# Patient Record
Sex: FEMALE | Race: OTHER | NOT HISPANIC OR LATINO
[De-identification: names, ages, dates, MRNs, and addresses within clinical notes are randomized per-mention and may not be internally consistent; named-entity substitution may affect disease eponyms.]

---

## 2017-05-16 ENCOUNTER — APPOINTMENT (OUTPATIENT)
Dept: ENDOCRINOLOGY | Facility: CLINIC | Age: 32
End: 2017-05-16

## 2017-08-10 ENCOUNTER — APPOINTMENT (OUTPATIENT)
Dept: ENDOCRINOLOGY | Facility: CLINIC | Age: 32
End: 2017-08-10

## 2017-11-04 ENCOUNTER — TRANSCRIPTION ENCOUNTER (OUTPATIENT)
Age: 32
End: 2017-11-04

## 2018-07-12 ENCOUNTER — APPOINTMENT (OUTPATIENT)
Dept: ENDOCRINOLOGY | Facility: CLINIC | Age: 33
End: 2018-07-12
Payer: COMMERCIAL

## 2018-07-12 VITALS
SYSTOLIC BLOOD PRESSURE: 112 MMHG | HEIGHT: 61 IN | WEIGHT: 186.5 LBS | BODY MASS INDEX: 35.21 KG/M2 | DIASTOLIC BLOOD PRESSURE: 77 MMHG | RESPIRATION RATE: 76 BRPM

## 2018-07-12 LAB
BASOPHILS # BLD AUTO: 0.02 K/UL
BASOPHILS NFR BLD AUTO: 0.3 %
EOSINOPHIL # BLD AUTO: 0.16 K/UL
EOSINOPHIL NFR BLD AUTO: 2 %
GLUCOSE BLDC GLUCOMTR-MCNC: 205
HBA1C MFR BLD HPLC: 10.4
HCT VFR BLD CALC: 40.1 %
HGB BLD-MCNC: 12.9 G/DL
IMM GRANULOCYTES NFR BLD AUTO: 0.5 %
LYMPHOCYTES # BLD AUTO: 2.73 K/UL
LYMPHOCYTES NFR BLD AUTO: 34.9 %
MAN DIFF?: NORMAL
MCHC RBC-ENTMCNC: 25.8 PG
MCHC RBC-ENTMCNC: 32.2 GM/DL
MCV RBC AUTO: 80.2 FL
MONOCYTES # BLD AUTO: 0.35 K/UL
MONOCYTES NFR BLD AUTO: 4.5 %
NEUTROPHILS # BLD AUTO: 4.52 K/UL
NEUTROPHILS NFR BLD AUTO: 57.8 %
PLATELET # BLD AUTO: 230 K/UL
RBC # BLD: 5 M/UL
RBC # FLD: 14.6 %
WBC # FLD AUTO: 7.82 K/UL

## 2018-07-12 PROCEDURE — 99204 OFFICE O/P NEW MOD 45 MIN: CPT | Mod: 25

## 2018-07-12 PROCEDURE — 82962 GLUCOSE BLOOD TEST: CPT

## 2018-07-12 PROCEDURE — 83036 HEMOGLOBIN GLYCOSYLATED A1C: CPT | Mod: QW

## 2018-07-12 RX ORDER — BLOOD-GLUCOSE SENSOR
EACH MISCELLANEOUS
Qty: 3 | Refills: 5 | Status: DISCONTINUED | COMMUNITY
Start: 2018-07-12 | End: 2018-07-12

## 2018-07-18 LAB
25(OH)D3 SERPL-MCNC: 14.3 NG/ML
ALBUMIN SERPL ELPH-MCNC: 4.3 G/DL
ALP BLD-CCNC: 98 U/L
ALT SERPL-CCNC: 15 U/L
ANION GAP SERPL CALC-SCNC: 17 MMOL/L
AST SERPL-CCNC: 15 U/L
BILIRUB SERPL-MCNC: 0.4 MG/DL
BUN SERPL-MCNC: 16 MG/DL
CALCIUM SERPL-MCNC: 8.6 MG/DL
CHLORIDE SERPL-SCNC: 99 MMOL/L
CHOLEST SERPL-MCNC: 188 MG/DL
CHOLEST/HDLC SERPL: 3.8 RATIO
CO2 SERPL-SCNC: 22 MMOL/L
CREAT SERPL-MCNC: 0.78 MG/DL
ESTIMATED AVERAGE GLUCOSE: 258 MG/DL
GLUCOSE SERPL-MCNC: 297 MG/DL
HBA1C MFR BLD HPLC: 10.6 %
HDLC SERPL-MCNC: 50 MG/DL
LDLC SERPL CALC-MCNC: 118 MG/DL
POTASSIUM SERPL-SCNC: 4.4 MMOL/L
PROT SERPL-MCNC: 7 G/DL
SODIUM SERPL-SCNC: 138 MMOL/L
TRIGL SERPL-MCNC: 99 MG/DL
TSH SERPL-ACNC: 7.27 UIU/ML

## 2018-08-23 ENCOUNTER — APPOINTMENT (OUTPATIENT)
Dept: ENDOCRINOLOGY | Facility: CLINIC | Age: 33
End: 2018-08-23

## 2018-09-18 ENCOUNTER — APPOINTMENT (OUTPATIENT)
Dept: ENDOCRINOLOGY | Facility: CLINIC | Age: 33
End: 2018-09-18

## 2018-10-03 ENCOUNTER — APPOINTMENT (OUTPATIENT)
Dept: ENDOCRINOLOGY | Facility: CLINIC | Age: 33
End: 2018-10-03
Payer: MEDICAID

## 2018-10-03 ENCOUNTER — LABORATORY RESULT (OUTPATIENT)
Age: 33
End: 2018-10-03

## 2018-10-03 VITALS
WEIGHT: 188 LBS | DIASTOLIC BLOOD PRESSURE: 84 MMHG | BODY MASS INDEX: 35.52 KG/M2 | HEART RATE: 93 BPM | SYSTOLIC BLOOD PRESSURE: 122 MMHG

## 2018-10-03 LAB
GLUCOSE BLDC GLUCOMTR-MCNC: 164
HBA1C MFR BLD HPLC: 9.3

## 2018-10-03 PROCEDURE — 97802 MEDICAL NUTRITION INDIV IN: CPT

## 2018-10-03 PROCEDURE — 82962 GLUCOSE BLOOD TEST: CPT

## 2018-10-03 PROCEDURE — 83036 HEMOGLOBIN GLYCOSYLATED A1C: CPT | Mod: QW

## 2018-10-03 PROCEDURE — 99214 OFFICE O/P EST MOD 30 MIN: CPT | Mod: 25

## 2018-10-03 RX ORDER — LEVOTHYROXINE SODIUM 0.1 MG/1
100 TABLET ORAL
Refills: 0 | Status: DISCONTINUED | COMMUNITY
End: 2018-10-03

## 2018-10-03 RX ORDER — TRANSPARENT DRESSING 2.37X2.75"
BANDAGE TOPICAL
Qty: 1 | Refills: 1 | Status: ACTIVE | COMMUNITY
Start: 2018-10-03 | End: 1900-01-01

## 2018-10-09 LAB
25(OH)D3 SERPL-MCNC: 13.6 NG/ML
ALBUMIN SERPL ELPH-MCNC: 4.2 G/DL
ALP BLD-CCNC: 96 U/L
ALT SERPL-CCNC: 16 U/L
ANION GAP SERPL CALC-SCNC: 16 MMOL/L
APPEARANCE: ABNORMAL
AST SERPL-CCNC: 18 U/L
BASOPHILS # BLD AUTO: 0.04 K/UL
BASOPHILS NFR BLD AUTO: 0.4 %
BILIRUB SERPL-MCNC: 0.4 MG/DL
BILIRUBIN URINE: NEGATIVE
BLOOD URINE: NEGATIVE
BUN SERPL-MCNC: 11 MG/DL
C PEPTIDE SERPL-MCNC: <0.1 NG/ML
CALCIUM SERPL-MCNC: 9.4 MG/DL
CHLORIDE SERPL-SCNC: 101 MMOL/L
CO2 SERPL-SCNC: 22 MMOL/L
COLOR: YELLOW
CREAT SERPL-MCNC: 0.74 MG/DL
CREAT SPEC-SCNC: 71 MG/DL
EOSINOPHIL # BLD AUTO: 0.18 K/UL
EOSINOPHIL NFR BLD AUTO: 1.9 %
ESTIMATED AVERAGE GLUCOSE: 235 MG/DL
GAD65 AB SER-MCNC: 0.24 NMOL/L
GLUCOSE QUALITATIVE U: NEGATIVE MG/DL
GLUCOSE SERPL-MCNC: 160 MG/DL
HBA1C MFR BLD HPLC: 9.8 %
HCT VFR BLD CALC: 39.7 %
HGB BLD-MCNC: 12.5 G/DL
IMM GRANULOCYTES NFR BLD AUTO: 0.6 %
INSULIN ANTIBODIES-ESOTERIX: 9.6 UU/ML
KETONES URINE: NEGATIVE
LEUKOCYTE ESTERASE URINE: ABNORMAL
LYMPHOCYTES # BLD AUTO: 2.74 K/UL
LYMPHOCYTES NFR BLD AUTO: 29.4 %
MAN DIFF?: NORMAL
MCHC RBC-ENTMCNC: 25.5 PG
MCHC RBC-ENTMCNC: 31.5 GM/DL
MCV RBC AUTO: 80.9 FL
MICROALBUMIN 24H UR DL<=1MG/L-MCNC: <1.2 MG/DL
MICROALBUMIN/CREAT 24H UR-RTO: NORMAL
MONOCYTES # BLD AUTO: 0.54 K/UL
MONOCYTES NFR BLD AUTO: 5.8 %
NEUTROPHILS # BLD AUTO: 5.77 K/UL
NEUTROPHILS NFR BLD AUTO: 61.9 %
NITRITE URINE: NEGATIVE
PH URINE: 6.5
PLATELET # BLD AUTO: 245 K/UL
POTASSIUM SERPL-SCNC: 4.2 MMOL/L
PROT SERPL-MCNC: 7.1 G/DL
PROTEIN URINE: NEGATIVE MG/DL
RBC # BLD: 4.91 M/UL
RBC # FLD: 14 %
SODIUM SERPL-SCNC: 139 MMOL/L
SPECIFIC GRAVITY URINE: 1.02
TSH SERPL-ACNC: 0.27 UIU/ML
UROBILINOGEN URINE: NEGATIVE MG/DL
WBC # FLD AUTO: 9.33 K/UL

## 2018-11-19 ENCOUNTER — RX RENEWAL (OUTPATIENT)
Age: 33
End: 2018-11-19

## 2019-01-03 ENCOUNTER — APPOINTMENT (OUTPATIENT)
Dept: ENDOCRINOLOGY | Facility: CLINIC | Age: 34
End: 2019-01-03

## 2019-04-30 ENCOUNTER — RX RENEWAL (OUTPATIENT)
Age: 34
End: 2019-04-30

## 2019-05-08 ENCOUNTER — APPOINTMENT (OUTPATIENT)
Dept: ENDOCRINOLOGY | Facility: CLINIC | Age: 34
End: 2019-05-08

## 2019-05-21 ENCOUNTER — RX RENEWAL (OUTPATIENT)
Age: 34
End: 2019-05-21

## 2019-05-29 ENCOUNTER — APPOINTMENT (OUTPATIENT)
Dept: ENDOCRINOLOGY | Facility: CLINIC | Age: 34
End: 2019-05-29
Payer: MEDICAID

## 2019-05-29 VITALS
WEIGHT: 186 LBS | SYSTOLIC BLOOD PRESSURE: 101 MMHG | HEART RATE: 99 BPM | BODY MASS INDEX: 35.14 KG/M2 | DIASTOLIC BLOOD PRESSURE: 75 MMHG

## 2019-05-29 DIAGNOSIS — Z83.3 FAMILY HISTORY OF DIABETES MELLITUS: ICD-10-CM

## 2019-05-29 PROCEDURE — 99214 OFFICE O/P EST MOD 30 MIN: CPT | Mod: 25

## 2019-05-29 PROCEDURE — 82962 GLUCOSE BLOOD TEST: CPT

## 2019-05-29 PROCEDURE — 83036 HEMOGLOBIN GLYCOSYLATED A1C: CPT | Mod: QW

## 2019-05-29 RX ORDER — INSULIN LISPRO 100 [IU]/ML
100 INJECTION, SOLUTION INTRAVENOUS; SUBCUTANEOUS
Qty: 2 | Refills: 1 | Status: ACTIVE | COMMUNITY
Start: 2019-04-30 | End: 1900-01-01

## 2019-05-30 LAB
25(OH)D3 SERPL-MCNC: 7.4 NG/ML
ALBUMIN SERPL ELPH-MCNC: 4.3 G/DL
ALP BLD-CCNC: 141 U/L
ALT SERPL-CCNC: 17 U/L
ANION GAP SERPL CALC-SCNC: 14 MMOL/L
AST SERPL-CCNC: 21 U/L
BASOPHILS # BLD AUTO: 0.05 K/UL
BASOPHILS NFR BLD AUTO: 0.5 %
BILIRUB SERPL-MCNC: 0.3 MG/DL
BUN SERPL-MCNC: 16 MG/DL
CALCIUM SERPL-MCNC: 9.9 MG/DL
CHLORIDE SERPL-SCNC: 101 MMOL/L
CHOLEST SERPL-MCNC: 203 MG/DL
CHOLEST/HDLC SERPL: 3.8 RATIO
CO2 SERPL-SCNC: 25 MMOL/L
CREAT SERPL-MCNC: 0.7 MG/DL
EOSINOPHIL # BLD AUTO: 0.4 K/UL
EOSINOPHIL NFR BLD AUTO: 3.9 %
ESTIMATED AVERAGE GLUCOSE: 235 MG/DL
GLUCOSE BLDC GLUCOMTR-MCNC: 130
GLUCOSE SERPL-MCNC: 95 MG/DL
HBA1C MFR BLD HPLC: 9.4
HBA1C MFR BLD HPLC: 9.8 %
HCT VFR BLD CALC: 42.7 %
HDLC SERPL-MCNC: 53 MG/DL
HGB BLD-MCNC: 13.2 G/DL
IMM GRANULOCYTES NFR BLD AUTO: 0.4 %
LDLC SERPL CALC-MCNC: 101 MG/DL
LYMPHOCYTES # BLD AUTO: 3.39 K/UL
LYMPHOCYTES NFR BLD AUTO: 33.2 %
MAN DIFF?: NORMAL
MCHC RBC-ENTMCNC: 25.2 PG
MCHC RBC-ENTMCNC: 30.9 GM/DL
MCV RBC AUTO: 81.6 FL
MONOCYTES # BLD AUTO: 0.6 K/UL
MONOCYTES NFR BLD AUTO: 5.9 %
NEUTROPHILS # BLD AUTO: 5.73 K/UL
NEUTROPHILS NFR BLD AUTO: 56.1 %
PLATELET # BLD AUTO: 283 K/UL
POTASSIUM SERPL-SCNC: 4.2 MMOL/L
PROT SERPL-MCNC: 7.4 G/DL
RBC # BLD: 5.23 M/UL
RBC # FLD: 14.2 %
SODIUM SERPL-SCNC: 140 MMOL/L
T4 FREE SERPL-MCNC: 1.5 NG/DL
TRIGL SERPL-MCNC: 244 MG/DL
TSH SERPL-ACNC: 0.47 UIU/ML
WBC # FLD AUTO: 10.21 K/UL

## 2019-05-30 NOTE — ASSESSMENT
[Long Term Vascular Complications] : long term vascular complications of diabetes [Importance of Diet and Exercise] : importance of diet and exercise to improve glycemic control, achieve weight loss and improve cardiovascular health [Action and use of Insulin] : action and use of short and long-acting insulin [Self Monitoring of Blood Glucose] : self monitoring of blood glucose [Insulin Self-Administration] : insulin self-administration [Retinopathy Screening] : Patient was referred to ophthalmology for retinopathy screening [FreeTextEntry1] : 33 year old female presenting for follow up of uncontrolled T1DM, hypothyroidism, vitamin D deficiency.  Her compliance with follow up visits and adhering to prescribed medications and diabetes self care has been poor.\par \par Type 1 diabetes, uncontrolled, no known complications\par Diagnosed approximately 8 yrs ago\par POC A1C today is 9.4%, goal is <7%.  POC blood glucose today is 130 mg/dL.\par Current regimen is toujeo 30 untit at bedtime (admits to missing once weekly but considers this Rx less important than humalog) and humalog 20-25 units with meals (states she is compliant with this)\par She brought no SMBG data for review today.  Reports FBGs in 400s.  She has freestyle lena CGM but is not using it and has omnipod pump supplies but has not returned calls from pump  to schedule training and initiate pump.  She states that her concern about the pump is that administering insulin overnight will cause her to go too low and she won't wake up.  I explained to her that taking toujeo gives her basal insulin overnight even though she is not injecting during the night and that with the pump, we can be conservative with basal settings during the night when starting if that would make her more comfortable.  Discussed benefits of pump in her case (adjustable basal rates at different times of day, extended bolusing, bolus calculator).\par She states today she is still very interested in proceeding with insulin pump and is ready to start.\par -I emailed omnipod rep and called pump  to initiate process of scheduling training.\par -Restart freestyle lena.  I refilled sensors.  She downloaded funmilayo and I gave her my email so she can share BG dta with me and we can review together via email in the interim between visits.   Dexcom was not covered \par -She declines insulin adjustments at this time.  Wishes to continue current regimen and proceed with pump.  I encouraged her to be more compliant with prescribed regimen especially toujeo.  Recommended she set a phone alarm to remember to take it or alternatively make a habit of taking it with her levothyroxine since she has no trouble remembering that pill.\par Patient's email [email or web address]\par -Check labs today for CBC, CMP, A1C, lipids\par -Overdue for ophthalmology.  Referred today\par -She is willing to return to clinic a month after her pump training so will schedule for 2 months out at checkout\par -Also to see Dr Morataya in 4 mons\par \par Hypothyroidism\par Patient reports perfect compliance with levothyroxine 112 mcg daily.  She states she feels better on higher dose, better energy.\par TSH was 0.27 in Oct 2018 on this dose, normal\par -Check TSH today \par \par Vitamin D deficiency\par Vitamin D was 14.3 so I prescribed ergocalciferol 50K units weekly x 12 weeks, however, patient never picked up Rx.  I resent Rx at a subsequent visit after discussing risks of vitamin D deficiency if untreated.  She stated she would pick it up and start but never did.\par -Will recheck vitamin D today

## 2019-05-30 NOTE — ADDENDUM
[FreeTextEntry1] : 5/30 JT: Reviewed lab results.  TGs high.  Needs to lose weight, work on diet, should see RD.  Tchol and LDL a bit elevated but patietn not fasting.  Creat and electrolytes normal (no DKA), TSH normal on current levothyroxine dose.  A1C 9.8%.  Vitamin D pending.  Continue with plan as discussed at visit.  Await vitamin D.  Emailed patient re results.

## 2019-05-30 NOTE — REASON FOR VISIT
[Follow-Up: _____] : a [unfilled] follow-up visit [FreeTextEntry1] : type 1 diabetes, uncontrolled without complications

## 2019-05-30 NOTE — HISTORY OF PRESENT ILLNESS
[FreeTextEntry1] : Ms Galarza is a 33 year old female presenting for follow up of type 1 diabetes.  Her father is also a patient of Dr Acosta.  \par \par DIABETES HISTORY:\par Type of Diabetes:  Type 1 DM\par Year of diagnosis:  8 yrs ago (~2011)\par Symptoms at time of diagnosis:  polyuria, thrush, polydipsia\par Prior hospitalizations for diabetes: Was hospitalized during pregnancy for concerns about BG levels, daughter is 5, pt reports not in DKA at that time but has previously been in DKA\par \par COMPLICATIONS OF DIABETES:\par Eye disease in the past: no\par 	Last eye exam: pt is due\par Nephropathy:  no\par Peripheral neuropathy: no\par 	History of foot sores/infections\par 	History of amputations\par Autonomic neuropathy:  no\par Macrovascular disease:  no\par 	Takes a daily aspirin:  no \par \par CURRENT DIABETES TREATMENT\par Humalog 20-25 units with meals, toujeo 30 units at night (usually forgets once per week)\par \par DIET/EXERCISE HISTORY:\par Has met with dietician: no\par Exercise:  Walking as part of her commute\par Weight change:  Norm is 130.  Gained 50 lbs over past three years which she attributes to insulin stacking\par \par GLYCEMIC CONTROL:\par Glucometer:  One touch\par Frequency of glucose monitoring: Daily in AM and if symptomatic\par \par Review of home blood glucose readings:\par She did nto bring her meter or lena sensor with her today.  She has lena sensors at home but has not been wearing.  She tried it once and had bruising and issues with adhesive.  She liked the info it provided but never retried it.\par She reports FBGs in 400s (ora phenomenon).  Not checking at other times of day currently\par \par Hypoglycemic awareness: yes\par Frequency of lows:  infrequent\par \par 10/2018- Only used initial lena sensor (never placed a new one) because she had trouble with the adhesive, itching, bruising.  She would be interested in retrying.  She states she has been more diligent about what she is eating but finds it hard to resist snacks (will eat a cookie or a bag of chips over the course of an hour).  Also finds that a cup of black coffee raises her blood sugar.  Not feeling well overall.  Urinating a lot\par \par 5/29/19: Patient returns for follow up.  Reports she knows she needs to do better with her diabetes but she gets so busy with work and caring for her daughter (single mom) that she gets sidetracked.  Has a new boss who is very understanding if she needs to attend a doctor's appointment.  Noting some blurry vision and tingling in feet which she is nervous about.  has lost weight over past few months and nervous that she has been in DKA this whole time.  She was unresponsive to omnipod pump trainers to schedule pump training but states she is ready now.  Also willing to retry freestyle lena and downloaded funmilayo in office with me.\par She notes perfect complicance with levothyroxine.  Never picked up vitamin D rx.

## 2019-07-18 ENCOUNTER — RX RENEWAL (OUTPATIENT)
Age: 34
End: 2019-07-18

## 2019-07-31 ENCOUNTER — APPOINTMENT (OUTPATIENT)
Dept: ENDOCRINOLOGY | Facility: CLINIC | Age: 34
End: 2019-07-31

## 2019-08-09 ENCOUNTER — MEDICATION RENEWAL (OUTPATIENT)
Age: 34
End: 2019-08-09

## 2019-08-09 ENCOUNTER — MOBILE ON CALL (OUTPATIENT)
Age: 34
End: 2019-08-09

## 2019-09-18 ENCOUNTER — APPOINTMENT (OUTPATIENT)
Dept: ENDOCRINOLOGY | Facility: CLINIC | Age: 34
End: 2019-09-18

## 2019-10-16 ENCOUNTER — APPOINTMENT (OUTPATIENT)
Dept: ENDOCRINOLOGY | Facility: CLINIC | Age: 34
End: 2019-10-16

## 2020-01-28 ENCOUNTER — APPOINTMENT (OUTPATIENT)
Dept: ENDOCRINOLOGY | Facility: CLINIC | Age: 35
End: 2020-01-28
Payer: COMMERCIAL

## 2020-01-28 VITALS
BODY MASS INDEX: 34.58 KG/M2 | DIASTOLIC BLOOD PRESSURE: 75 MMHG | SYSTOLIC BLOOD PRESSURE: 105 MMHG | WEIGHT: 183 LBS | HEART RATE: 92 BPM

## 2020-01-28 PROCEDURE — 83036 HEMOGLOBIN GLYCOSYLATED A1C: CPT | Mod: QW

## 2020-01-28 PROCEDURE — 82962 GLUCOSE BLOOD TEST: CPT

## 2020-01-28 PROCEDURE — 99215 OFFICE O/P EST HI 40 MIN: CPT | Mod: 25

## 2020-01-29 LAB
25(OH)D3 SERPL-MCNC: 13.4 NG/ML
ALBUMIN SERPL ELPH-MCNC: 4.5 G/DL
ALP BLD-CCNC: 93 U/L
ALT SERPL-CCNC: 14 U/L
ANION GAP SERPL CALC-SCNC: 16 MMOL/L
AST SERPL-CCNC: 17 U/L
BASOPHILS # BLD AUTO: 0.07 K/UL
BASOPHILS NFR BLD AUTO: 0.6 %
BILIRUB SERPL-MCNC: 0.2 MG/DL
BUN SERPL-MCNC: 13 MG/DL
CALCIUM SERPL-MCNC: 9.8 MG/DL
CHLORIDE SERPL-SCNC: 102 MMOL/L
CHOLEST SERPL-MCNC: 199 MG/DL
CHOLEST/HDLC SERPL: 3.3 RATIO
CO2 SERPL-SCNC: 23 MMOL/L
CREAT SERPL-MCNC: 0.69 MG/DL
CREAT SPEC-SCNC: 119 MG/DL
EOSINOPHIL # BLD AUTO: 0.41 K/UL
EOSINOPHIL NFR BLD AUTO: 3.6 %
ESTIMATED AVERAGE GLUCOSE: 220 MG/DL
GLUCOSE BLDC GLUCOMTR-MCNC: 129
GLUCOSE SERPL-MCNC: 97 MG/DL
HBA1C MFR BLD HPLC: 8.6
HBA1C MFR BLD HPLC: 9.3 %
HCT VFR BLD CALC: 42.1 %
HDLC SERPL-MCNC: 61 MG/DL
HGB BLD-MCNC: 13.3 G/DL
IMM GRANULOCYTES NFR BLD AUTO: 0.8 %
LDLC SERPL CALC-MCNC: 119 MG/DL
LYMPHOCYTES # BLD AUTO: 3.04 K/UL
LYMPHOCYTES NFR BLD AUTO: 26.4 %
MAN DIFF?: NORMAL
MCHC RBC-ENTMCNC: 25.6 PG
MCHC RBC-ENTMCNC: 31.6 GM/DL
MCV RBC AUTO: 81 FL
MICROALBUMIN 24H UR DL<=1MG/L-MCNC: <1.2 MG/DL
MICROALBUMIN/CREAT 24H UR-RTO: NORMAL MG/G
MONOCYTES # BLD AUTO: 0.67 K/UL
MONOCYTES NFR BLD AUTO: 5.8 %
NEUTROPHILS # BLD AUTO: 7.24 K/UL
NEUTROPHILS NFR BLD AUTO: 62.8 %
PLATELET # BLD AUTO: 270 K/UL
POTASSIUM SERPL-SCNC: 4.1 MMOL/L
PROT SERPL-MCNC: 7.5 G/DL
RBC # BLD: 5.2 M/UL
RBC # FLD: 13.9 %
SODIUM SERPL-SCNC: 141 MMOL/L
T4 FREE SERPL-MCNC: 1.2 NG/DL
TRIGL SERPL-MCNC: 98 MG/DL
TSH SERPL-ACNC: 2.06 UIU/ML
VIT B12 SERPL-MCNC: 573 PG/ML
WBC # FLD AUTO: 11.52 K/UL

## 2020-01-29 RX ORDER — BLOOD SUGAR DIAGNOSTIC
STRIP MISCELLANEOUS
Qty: 1 | Refills: 4 | Status: ACTIVE | COMMUNITY
Start: 2020-01-29 | End: 1900-01-01

## 2020-01-29 NOTE — ADDENDUM
[FreeTextEntry1] : 1/29/2020: I reviewed labs performed at visit.  A1C 9.3%.  Sig vitamin D deficiency.  Otherwise, labs as expected.  Will Rx ergo and communicate these results to patient via email.

## 2020-01-29 NOTE — ASSESSMENT
[Hypoglycemia Management] : hypoglycemia management [Long Term Vascular Complications] : long term vascular complications of diabetes [Importance of Diet and Exercise] : importance of diet and exercise to improve glycemic control, achieve weight loss and improve cardiovascular health [Action and use of Insulin] : action and use of short and long-acting insulin [Self Monitoring of Blood Glucose] : self monitoring of blood glucose [FreeTextEntry1] : 34 year old female presenting for follow up of uncontrolled T1DM, hypothyroidism, vitamin D deficiency.  Her compliance with follow up visits and adhering to prescribed medications and diabetes self care has been poor.\par \par Type 1 diabetes, uncontrolled, no known complications\par Diagnosed approximately 9 yrs ago\par POC A1C today is 8.6%, goal is <7%.  POC blood glucose today is 129 mg/dL.  Her A1C is the lowest it has been since she has been a patient here, however, my concern is that she has wide fluctuations in her blood sugar including frequent episodes of hypoglycemia twice weekly.\par Current regimen is Humalog CSII via omnipod pump 1.35 units per hour basal with 1:6 ICR.  She typically delivers 45-50 units of insulin daily.  She notes that she frequently increases her bolus dose above what is recommended because she doesn't feel it will be adequate.\par She is not currently monitoring her blood sugar and wishes to restart a CGM.\par Dexcom was not covered previously but will reapply for this as long as she has no reaction to the adhesive.  I gave her a sample sensor today to try out the adhesive.  She will email me and let me know if she can tolerate.\par -Needs to check BG with meter at least 4+ times daily.  Gave her new OneTouch meter and refilled strips.\par She is insulin resistant with relatively high insulin requirements.  I believe she would benefit from an insulin sensitizer.  She has not tried metformin previously and I prescribed this today.\par Patient's email [email or web address]\par -Check full labs today\par RTC 4 months\par \par Hypothyroidism\par Patient reports perfect compliance with levothyroxine 112 mcg daily.  \par TSH was 0.47 in May 2019 on this dose, normal\par -Check TSH today \par \par Vitamin D deficiency\par Vitamin D was 14.3 so I prescribed ergocalciferol 50K units weekly x 12 weeks, however, patient never picked up Rx.  I resent Rx at a subsequent visit after discussing risks of vitamin D deficiency if untreated.  She stated she would pick it up and start but never did.\par -Will recheck vitamin D today and likely recommend vitamin D supplementation again

## 2020-01-29 NOTE — HISTORY OF PRESENT ILLNESS
[FreeTextEntry1] : Ms Galarza is a 34 year old female presenting for follow up of type 1 diabetes.  Her father is also a patient of Dr Acosta.  \par \par DIABETES HISTORY:\par Type of Diabetes:  Type 1 DM\par Year of diagnosis: 2011\par Symptoms at time of diagnosis:  polyuria, thrush, polydipsia\par Prior hospitalizations for diabetes: Was hospitalized during pregnancy for concerns about BG levels, daughter is 5, pt reports not in DKA at that time but has previously been in DKA\par \par COMPLICATIONS OF DIABETES:\par Eye disease in the past: no\par 	Last eye exam: pt is due\par Nephropathy:  no\par Peripheral neuropathy: no\par 	History of foot sores/infections\par 	History of amputations\par Autonomic neuropathy:  no\par Macrovascular disease:  no\par 	Takes a daily aspirin:  no \par \par CURRENT DIABETES TREATMENT\par Omnipod (Humalog) basal rate of 1.35 units/hr, ICR 1:6, requiring about 45-50 units per day\par \par DIET/EXERCISE HISTORY:\par Has met with dietician: no\par Exercise:  Walking as part of her commute\par Weight change:  Recently stable but 3-4 yrs ago was about 130 lbs\par \par GLYCEMIC CONTROL:\par Glucometer:  One touch but does not remember the last time she used it.  She has been using Freestyle Koby CGM but stopped months ago due to skin reaction.  She has not been monitoring her blood sugar\par Frequency of glucose monitoring: rarely\par \par Review of home blood glucose readings: n/a\par \par Hypoglycemic awareness: No.  Takes low BG to 30 mg/dL to wake her during the night\par Frequency of lows:  twice weekly \par \par 10/2018- Only used initial koby sensor (never placed a new one) because she had trouble with the adhesive, itching, bruising.  She would be interested in retrying.  She states she has been more diligent about what she is eating but finds it hard to resist snacks (will eat a cookie or a bag of chips over the course of an hour).  Also finds that a cup of black coffee raises her blood sugar.  Not feeling well overall.  Urinating a lot\par \par 5/29/19: Patient returns for follow up.  Reports she knows she needs to do better with her diabetes but she gets so busy with work and caring for her daughter (single mom) that she gets sidetracked.  Has a new boss who is very understanding if she needs to attend a doctor's appointment.  Noting some blurry vision and tingling in feet which she is nervous about.  has lost weight over past few months and nervous that she has been in DKA this whole time.  She was unresponsive to omnipod pump trainers to schedule pump training but states she is ready now.  Also willing to retry freestyle koby and downloaded funmilayo in office with me.\par She notes perfect complicance with levothyroxine.  Never picked up vitamin D rx.\par \par 1/2020: Her basal rate is currently 1.35 units/hr.  ICR 1:6 using about 45-50 units of insulin daily.  No longer wearing freestyle koby as documented above.  So far today had a breakfast sandwich around 10 ASM and then some candy in the cab on the way over because she felt low.  She is having more anxiety lately and feeling somewhat depressed around the time of her menses.  Also noting BG spikes high around 3 AM, used to spike around 5:30 AM.  THe spike wakes her up.  She notes she is perfectly compliant with levothyroxine.\par

## 2020-03-23 ENCOUNTER — APPOINTMENT (OUTPATIENT)
Dept: OBGYN | Facility: CLINIC | Age: 35
End: 2020-03-23

## 2020-04-28 ENCOUNTER — APPOINTMENT (OUTPATIENT)
Dept: ENDOCRINOLOGY | Facility: CLINIC | Age: 35
End: 2020-04-28
Payer: COMMERCIAL

## 2020-04-28 PROCEDURE — 99215 OFFICE O/P EST HI 40 MIN: CPT | Mod: 95

## 2020-04-28 RX ORDER — BLOOD SUGAR DIAGNOSTIC
STRIP MISCELLANEOUS
Qty: 250 | Refills: 5 | Status: ACTIVE | COMMUNITY
Start: 2020-04-28 | End: 1900-01-01

## 2020-04-28 RX ORDER — INSULIN GLARGINE 300 U/ML
300 INJECTION, SOLUTION SUBCUTANEOUS
Refills: 0 | Status: DISCONTINUED | COMMUNITY
End: 2020-04-28

## 2020-04-28 RX ORDER — INSULIN LISPRO 100 [IU]/ML
100 INJECTION, SOLUTION INTRAVENOUS; SUBCUTANEOUS
Qty: 1 | Refills: 6 | Status: DISCONTINUED | COMMUNITY
Start: 2019-05-29 | End: 2020-04-28

## 2020-04-28 RX ORDER — FLASH GLUCOSE SENSOR
KIT MISCELLANEOUS
Qty: 3 | Refills: 1 | Status: DISCONTINUED | COMMUNITY
Start: 2018-07-12 | End: 2020-04-28

## 2020-04-28 NOTE — ASSESSMENT
[FreeTextEntry1] : Diabetes, type 1, suboptimal.  goal A1c 7.0 or less\par continue current  settings for now.  \par Suggested using temporary basal rate, increase by 10-20% x 24 hours, on days she is high while having her menses. \par Agree with using Dexcom CGM since she has hypo unawareness.  I will have her send me a 14 day report using Clarity funmilayo after she is using it.  For now, change strips to Freestyle, so she can use Omnipod PDM as glucometer and will not have carry another device (meter).\par ophtho recommended, I will email her a referral.  Explained that she needs a dilated eye exam to check for diabetic retinopathy.\par Agree with trying metformin to reduce insulin resistance, which she also has (probably due to obesity, BMI >30).  Start one tab with food, and if not having GI symptoms, increase to BID dosing in 2-3 weeks.  Since her A1c is on the high side now, she may not have to reduce insulin settings on pump, but if sugars are trending low with metformin on board, then will adjust her pump settings. \par buffalo hump?  CUshings unlikely but we can test with 1mg dex suppression test when pandemic subsides. \par \par Irregular menses.  ? perimenopause\par She has mood swings, anxiety that are coinciding with when her period is late and then symptoms resolve after her period comes.  Will start her on estrogen/progestin pill if mood swings occur again this month.\par If she is in perimenopause, this will affect fertility (in a negative way), but she is not desiring pregnancy (has one child, and not planning another).\par \par Vitamin D deficiency, continue D replacement.\par RTO 3 months\par \par \par \par

## 2020-04-28 NOTE — HISTORY OF PRESENT ILLNESS
[Home] : at home, [unfilled] , at the time of the visit. [Patient] : the patient [Other Location: e.g. Home (Enter Location, City,State)___] : at [unfilled] [Self] : self [FreeTextEntry1] : Diabetes diagnosed in 2011.   Using Omnipod.\par testing 8x/day and running out of strips.  Could not use Koby CGM due to rash from adhesive on disc.  Was finally approved for Dexcom and it should be shipping soon.  She gets occasional hypoglycemia, usually when she has overestimated what she is going to eat for dinner.  But she does not have hypoglycemia symptoms until sugars in the 40s range.\par Sugars tend to be high during her period, for the first three day especially. \par occasional tingling in her feet. no numbness\par has not seen ophtho ever\par current weight is 175 lb.\par \par Periods are usually regular but this month was two weeks late and she was having bad mood swings and anxiety.  Felt like she was having panic attacks and crying very easily (normally is not like this).  no hot flashes but sweating at night when normally she is cold.  Mother and some of her aunts went through menopause in their late 30s. 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  no muscle weakness.  she has easy bruising.\par \par Omnipod settings:\par basal 1.35 x 24h\par IC ratio 6\par correction 25

## 2020-04-28 NOTE — DATA REVIEWED
[FreeTextEntry1] : 2/20 (pt copies): A1c 8.6%, TSH 3.2, FSH 2.1\par 1/20: A1c 9.3%, tot chol 199, trig 98, HDL 61, . TSH 2.06, urine microalbumin < 1.2, 25D 13.4\par 5/19: A1c 9.8%, tot chol 203, trig 244, HDL 53, , TSH 0.47, 25D 7.4\par 10/18: C peptide < 0.1, glucose 160,  CRISTIANE  0.24, insulin Ab 9.6

## 2020-04-29 ENCOUNTER — RX RENEWAL (OUTPATIENT)
Age: 35
End: 2020-04-29

## 2020-05-20 RX ORDER — INSULIN LISPRO 100 [IU]/ML
100 INJECTION, SOLUTION INTRAVENOUS; SUBCUTANEOUS
Qty: 10 | Refills: 6 | Status: DISCONTINUED | COMMUNITY
Start: 2019-10-19 | End: 2020-05-20

## 2020-11-17 ENCOUNTER — APPOINTMENT (OUTPATIENT)
Dept: ENDOCRINOLOGY | Facility: CLINIC | Age: 35
End: 2020-11-17
Payer: COMMERCIAL

## 2020-11-17 PROCEDURE — 99215 OFFICE O/P EST HI 40 MIN: CPT | Mod: 95

## 2020-11-17 NOTE — ASSESSMENT
[FreeTextEntry1] : Diabetes, type 1, suboptimal.  goal A1c 7.0 or less\par continue current  settings for now.  \par Recommended she give metformin a try.  Discussed her concerns re med side effects.  Discussed that at this point the more pressing threat to her health is inadequately treated diabetes and that metformin use is safe in her from renal and cardiovascular perspective.  Her goal is to achieve better glycemic control without increasing insulin more and I believe this is the best way to reach that goal at this time.  If still not at goal after initiating metformin, Would decrease ICR to 1:5\par I got her dexcom linked to our clinic account today so can follow her tracings.  I reviewed 2 week CGM data summarized in data review above.\par Discussed rec for weight loss.  \par Will follow up with her in two weeks time to review CGM data and make insulin adjustments if needed\par REfilled levothyroxine\par \par Follow up 2 wks via telehealth\par \par \par \par  [Long Term Vascular Complications] : long term vascular complications of diabetes [Importance of Diet and Exercise] : importance of diet and exercise to improve glycemic control, achieve weight loss and improve cardiovascular health [Action and use of Insulin] : action and use of short and long-acting insulin [Self Monitoring of Blood Glucose] : self monitoring of blood glucose [Insulin Self-Administration] : insulin self-administration [Injection Technique, Storage, Sharps Disposal] : injection technique, storage, and sharps disposal

## 2020-11-17 NOTE — DATA REVIEWED
[FreeTextEntry1] : Today I reviewed CGM tracings via dexcom clarity\par 11/4-11/17/2020\par av glucose 217 mg/dL, 88 mg/dL SD\par 35% in range (), 2% low, remainder high\par GLucose starts dropping 4 AM-10 AM before she eats breakfast but is still mostly in range\par Then high remainder of the day\par \par Data previously reviewed are shown below\par 2/20 (pt copies): A1c 8.6%, TSH 3.2, FSH 2.1\par 1/20: A1c 9.3%, tot chol 199, trig 98, HDL 61, . TSH 2.06, urine microalbumin < 1.2, 25D 13.4\par 5/19: A1c 9.8%, tot chol 203, trig 244, HDL 53, , TSH 0.47, 25D 7.4\par 10/18: C peptide < 0.1, glucose 160,  CRISTIANE  0.24, insulin Ab 9.6

## 2020-11-17 NOTE — HISTORY OF PRESENT ILLNESS
[Home] : at home, [unfilled] , at the time of the visit. [Medical Office: ([facility])___] : at the medical office located in  [Verbal consent obtained from patient] : the patient, [unfilled] [FreeTextEntry1] : The duration of this teleheatlh visit was 48 minutes (11:32A-12:20P)\par Diabetes diagnosed in 2011.   Using Omnipod.\par  Could not use Koby CGM due to rash from adhesive on disc.  Was finally approved for Dexcom and now has been using that.\par Getting bruising and scarring at Omnipod insertion sites, recently started trying the back of her arm.  Sometimes notes bleeding with pod.  Using back and belly as pump sites.  Sometimes will get a message that the pump is occluded if she uses the same insertion site within a couple weeks time.  CGM sites are OK\par SHe did not go for blood work because she was nervous to be at the lab in the midst of covid pandemic\par No hypoglycemia symptoms until sugars in the 40s range.\par \par Omnipod settings:\par basal 1.35 x 24h\par IC ratio 6\par correction 25\par \par Has not tried metformin yet because she is nervous about the health of her heart and her kidneys

## 2020-12-01 ENCOUNTER — APPOINTMENT (OUTPATIENT)
Dept: ENDOCRINOLOGY | Facility: CLINIC | Age: 35
End: 2020-12-01
Payer: COMMERCIAL

## 2020-12-01 PROCEDURE — 99214 OFFICE O/P EST MOD 30 MIN: CPT | Mod: 95

## 2020-12-01 NOTE — HISTORY OF PRESENT ILLNESS
[Home] : at home, [unfilled] , at the time of the visit. [Medical Office: ([facility])___] : at the medical office located in  [Verbal consent obtained from patient] : the patient, [unfilled] [FreeTextEntry1] : The duration of this teleheatlh visit was 35 minutes (11:36 AM-12:11 PM)\par Diabetes diagnosed in 2011.   Using Omnipod.\par  Could not use Koby CGM due to rash from adhesive on disc.  Was finally approved for Dexcom and now has been using that.\par Getting bruising and scarring at Omnipod insertion sites, CGM sites are OK\par Over past two days, stopped snacking as much.  Also started giving mealtime insulin 30-40 minutes before a meal rather than 15 minutes before and feels this works better \par No hypoglycemia symptoms until sugars in the 40s range.\par \par Omnipod settings:\par basal 1.35 x 24h\par IC ratio 6\par correction 25\par \par Has not tried metformin yet because she is nervous about the health of her heart and her kidneys .  She did fill Rx but did not take first tab

## 2020-12-01 NOTE — ASSESSMENT
[FreeTextEntry1] : Diabetes, type 1, suboptimal.  goal A1c 7.0 or less\par continue current  settings for now.  \par Recommended she give metformin a try.  Discussed her concerns re med side effects.  Discussed that at this point the more pressing threat to her health is inadequately treated diabetes and that metformin use is safe in her from renal and cardiovascular perspective.  Her goal is to achieve better glycemic control without increasing insulin more and to achieve more insulin sensitivity and I believe this is the best way to reach that goal at this time. \par I am concerned she will not initiate metformin as she still has not done so but she states she will take her first dose tonight.  Could consider GLP1 as another non-insulin alternative.\par Will follow up in one week for med/insulin adjustment.  If no improvement and not pursuing non-insulin alternative, will likely increase basal rates at times she is trending high and decr from 7A-noon.\par Discussed rec for weight loss.  \par Reviewed carb counting.  Reviewed using parts of hand to estimate portion sizes and recommended using an funmilayo to assist with carb counting such as My Fitness Pal.\par \par Follow up 1 wk via telehealth\par \par Visit duriation from 11:36-12:11 (35 mins)\par 12/8 at 11:30 for follow up\par  [Long Term Vascular Complications] : long term vascular complications of diabetes [Importance of Diet and Exercise] : importance of diet and exercise to improve glycemic control, achieve weight loss and improve cardiovascular health [Action and use of Insulin] : action and use of short and long-acting insulin [Self Monitoring of Blood Glucose] : self monitoring of blood glucose

## 2020-12-01 NOTE — DATA REVIEWED
[FreeTextEntry1] : Today I reviewed CGM tracings via dexcom clarity\par 11/18-12/1\par av glucose 227 mg/dL, 77 mg/dL SD\par 27% in range (), <1% low, remainder high\par She has a detectable pattern of highs from noon-5P and 8P-midnight \par Over past two days, starting to drop at 7 AM and is almost low by noon.  States she did not change her breakfast but is just snacking less.\par \par Data previously reviewed are shown below\par 2/20 (pt copies): A1c 8.6%, TSH 3.2, FSH 2.1\par 1/20: A1c 9.3%, tot chol 199, trig 98, HDL 61, . TSH 2.06, urine microalbumin < 1.2, 25D 13.4\par 5/19: A1c 9.8%, tot chol 203, trig 244, HDL 53, , TSH 0.47, 25D 7.4\par 10/18: C peptide < 0.1, glucose 160,  CRISTIANE  0.24, insulin Ab 9.6

## 2020-12-08 ENCOUNTER — APPOINTMENT (OUTPATIENT)
Dept: ENDOCRINOLOGY | Facility: CLINIC | Age: 35
End: 2020-12-08
Payer: COMMERCIAL

## 2020-12-08 PROCEDURE — 99214 OFFICE O/P EST MOD 30 MIN: CPT | Mod: 95

## 2020-12-08 NOTE — HISTORY OF PRESENT ILLNESS
[Home] : at home, [unfilled] , at the time of the visit. [Medical Office: ([facility])___] : at the medical office located in  [Verbal consent obtained from patient] : the patient, [unfilled] [FreeTextEntry1] : The duration of this teleheatlh visit was 33 minutes (12:05 PM-12:38 PM)\par Diabetes diagnosed in 2011.   Using Omnipod, dexcom\par Stopped snacking as much, now roughly carb counting using google.  Also started giving mealtime insulin 30-40 minutes before a meal rather than 15 minutes before and feels this works better \par No hypoglycemia symptoms until sugars in the 40s range.\par \par Omnipod settings:\par basal 1.35 x 24h\par IC ratio 6\par correction 25\par \par Today for some reason woke up high (?stress of appt) and has had to give 5 units of correction thus far.  Still fasting.  At 7:40 was 161, gave 2U, then another 3 units at 8:30, and another 2 units at noon.  Her BG currently is around 140.\par \par Has not tried metformin yet because she is nervous about the health of her heart and her kidneys .  She did fill Rx but did not take first tab

## 2020-12-08 NOTE — ASSESSMENT
[FreeTextEntry1] : Diabetes, type 1, suboptimal.  goal A1c 7.0 or less\par She has made significant improvement in glycemic control over the past week due to less snacking, more carb counting, choosing healthier meals, and giving bolus doses 30-40 minutes before a meal as opposed to 15 minutes\par continue current  settings for now and will follow up in two weeks to review CGM tracings again\par DId not try metformin and does not want to.  Again discussed potential advantages to adding it to her regimen in her particular case, however, I will not push it as she is making changes on her own to improve glycemic control.\par Could consider GLP1 as another non-insulin alternative if necessary in the future\par Reviewed carb counting. Reviewed how to use [employer] and also recommended Calorie [employer].  Referred for nutrition telehealth visit\par \par Follow up 2 wks via telehealth\par  [Carbohydrate Consistent Diet] : carbohydrate consistent diet [Importance of Diet and Exercise] : importance of diet and exercise to improve glycemic control, achieve weight loss and improve cardiovascular health [Action and use of Insulin] : action and use of short and long-acting insulin [Self Monitoring of Blood Glucose] : self monitoring of blood glucose [Insulin Self-Administration] : insulin self-administration

## 2020-12-08 NOTE — DATA REVIEWED
[FreeTextEntry1] : Today I reviewed CGM tracings via dexcom clarity\par 11/25-12/8\par Comparing week of 11/25 to week of 12/2\par av glucose decreased from 225 to now 164\par Now with 63% time in range as opposed to previously only 30% in range\par Reviewed daily tracings as well

## 2020-12-22 ENCOUNTER — APPOINTMENT (OUTPATIENT)
Dept: ENDOCRINOLOGY | Facility: CLINIC | Age: 35
End: 2020-12-22
Payer: COMMERCIAL

## 2020-12-22 PROCEDURE — 99072 ADDL SUPL MATRL&STAF TM PHE: CPT

## 2020-12-22 PROCEDURE — 97802 MEDICAL NUTRITION INDIV IN: CPT

## 2020-12-29 ENCOUNTER — APPOINTMENT (OUTPATIENT)
Dept: ENDOCRINOLOGY | Facility: CLINIC | Age: 35
End: 2020-12-29
Payer: COMMERCIAL

## 2020-12-29 PROCEDURE — 99213 OFFICE O/P EST LOW 20 MIN: CPT | Mod: 95

## 2020-12-29 NOTE — HISTORY OF PRESENT ILLNESS
[Home] : at home, [unfilled] , at the time of the visit. [Medical Office: ([facility])___] : at the medical office located in  [Verbal consent obtained from patient] : the patient, [unfilled] [FreeTextEntry1] : The duration of this teleheatlh visit was 15 mins\par Diabetes diagnosed in 2011.   Using Omnipod, dexcom\par CGM sensor came off prematurely and took a while to replace it so was without her sensor Dec 18-27.  Reports she was doing fingersticks during that time and blood sugars were "good." Today, BG has been running a bit low.  "Didn't really treat it, just had a little bit of juice."  At time of call, BG is 125.  She has not been running low.  Typically in the AM is .  Not sure why low today but could be that she had a low fat dinner last night.\par She did meet with RD.\par No hypoglycemia symptoms until sugars in the 40s range.\par \par Omnipod settings:\par basal 1.35 x 24h\par IC ratio 6\par correction 25\par \par Has not tried metformin yet because she is nervous about the health of her heart and her kidneys .  She did fill Rx but did not take first tab

## 2020-12-29 NOTE — ASSESSMENT
[FreeTextEntry1] : Diabetes, type 1, suboptimal.  goal A1c 7.0 or less\par She has made significant improvement in glycemic control in the past few weeks due to less snacking, more carb counting, choosing healthier meals, and giving bolus doses 30-40 minutes before a meal as opposed to 15 minutes.\par She has also met with RD\par In past two weeks, not much sensor usage secondary to issues replacing a fallen off sensor.  No fingerstick data available.  Per patient report, BGs have been reasonably well controlled \par continue current  settings for now and will follow up in two weeks to review CGM tracings again\par Declines metformin at this time\par Could consider GLP1 as another non-insulin alternative if necessary in the future\par \par Follow up 2 wks via telehealth\par  [Carbohydrate Consistent Diet] : carbohydrate consistent diet [Importance of Diet and Exercise] : importance of diet and exercise to improve glycemic control, achieve weight loss and improve cardiovascular health [Hypoglycemia Management] : hypoglycemia management [Self Monitoring of Blood Glucose] : self monitoring of blood glucose [Insulin Self-Administration] : insulin self-administration

## 2020-12-29 NOTE — DATA REVIEWED
[FreeTextEntry1] : Today I reviewed CGM tracings via dexcom clarity\par 12/16-12/29\par Comparing week of 11/25 to week of 12/2\par av glucose 159 mg/dL, SD 68 mg/dL\par Now with 59% time in range, 6% low (more than prior), remainder high\par Reviewed daily tracings as well\par Again, this is based on decreased sensor usage (3/14 days)

## 2021-01-05 ENCOUNTER — APPOINTMENT (OUTPATIENT)
Dept: ENDOCRINOLOGY | Facility: CLINIC | Age: 36
End: 2021-01-05

## 2021-01-12 ENCOUNTER — APPOINTMENT (OUTPATIENT)
Dept: ENDOCRINOLOGY | Facility: CLINIC | Age: 36
End: 2021-01-12
Payer: COMMERCIAL

## 2021-01-12 PROCEDURE — 99214 OFFICE O/P EST MOD 30 MIN: CPT | Mod: 95

## 2021-01-13 NOTE — HISTORY OF PRESENT ILLNESS
[Home] : at home, [unfilled] , at the time of the visit. [Medical Office: ([facility])___] : at the medical office located in  [Verbal consent obtained from patient] : the patient, [unfilled] [FreeTextEntry1] : The duration of this teleheatlh visit was 28 minutes.\par Diabetes diagnosed in 2011.   Using Omnipod, dexcom\par Feels that she has not been doing as well with her diabetes in the past week.  She feels she needs to leave ample time after bolusing to eat so it has time to work and she hasn't been waiting long enough.  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  Dinner is around 6-6:30P\par She did meet with RD.\par No hypoglycemia symptoms until sugars in the 40s range.\par \par Omnipod settings:\par basal 1.30 x 24h\par IC ratio 6\par correction 25\par \par Has not tried metformin yet because she is nervous about the health of her heart and her kidneys .  She did fill Rx but did not take first tab

## 2021-01-13 NOTE — ASSESSMENT
[Importance of Diet and Exercise] : importance of diet and exercise to improve glycemic control, achieve weight loss and improve cardiovascular health [Hypoglycemia Management] : hypoglycemia management [Action and use of Insulin] : action and use of short and long-acting insulin [Self Monitoring of Blood Glucose] : self monitoring of blood glucose [Insulin Self-Administration] : insulin self-administration [FreeTextEntry1] : Diabetes, type 1, suboptimal.  goal A1c 7.0 or less\par She has made significant improvement in glycemic control in the past few weeks due to less snacking, more carb counting, choosing healthier meals, and giving bolus doses 30-40 minutes before a meal as opposed to 15 minutes.  Given risk of hypoglycemia this presents, would recommend changing to Fiasp and dosing at the onset of her meal.  Discussed with her that given rapid onset of this insulin, it is important she is ready to eat when she administers a bolus (cannot give before the meal as she is doing now).\par Would increase basal 3:30P-6:30P when she has a pattern of highs. New basals are 12a-3:30P 1.3, 3:30P-6:30P 1.35, 6:30P-12A 1.3\par She has also met with RD\par Declines metformin at this time.  I again discussed the benefits this could afford her including weight loss and decreased insulin requirements \par Could consider GLP1 as another non-insulin alternative if necessary in the future\par \par Follow up 2 wks via telehealth\par

## 2021-01-13 NOTE — DATA REVIEWED
[FreeTextEntry1] : Today I reviewed CGM tracings via dexcom clarity\par 12/30/2020-1/12/2021\par Average BG is 189 mg/dL, SD 75 mg/dL\par Time in range is 45% with 2% low and remainder of values are high\par Pt has a pattern of highs 11:40P-1:25A and 4:20P-5:40P\par Reviewed daily tracings as well

## 2021-01-26 ENCOUNTER — APPOINTMENT (OUTPATIENT)
Dept: ENDOCRINOLOGY | Facility: CLINIC | Age: 36
End: 2021-01-26
Payer: COMMERCIAL

## 2021-01-26 PROCEDURE — 99213 OFFICE O/P EST LOW 20 MIN: CPT | Mod: 95

## 2021-01-26 RX ORDER — METFORMIN HYDROCHLORIDE 500 MG/1
500 TABLET, COATED ORAL
Qty: 180 | Refills: 3 | Status: DISCONTINUED | COMMUNITY
Start: 2020-01-29 | End: 2021-01-26

## 2021-01-26 NOTE — DATA REVIEWED
[FreeTextEntry1] : Today I reviewed CGM tracings via dexcom clarity\par 1/13/2021-1/26/2021\par Average BG is 194 mg/dL, SD 77 mg/dL\par Time in range is 48% with 1% low and remainder of values are high\par Pt has a pattern of highs 2:10P-9:25P\par There were no patterns of lows but does seem to intermittently be having lows between 9P-12A after periods of highs (?overcorrecting)\par Reviewed daily tracings as well

## 2021-01-26 NOTE — ASSESSMENT
[FreeTextEntry1] : Diabetes, type 1, suboptimal.  goal A1c 7.0 or less\par Had made improvement in glycemic control due to less snacking, more carb counting, choosing healthier meals, and giving bolus doses 30-40 minutes before a meal as opposed to 15 minutes.  However, recently, control deteriorating again.  I had recommended changing to Fiasp and dosing at the onset of her meal to avoid hypoglycemia from early bolusing, but she has not yet made this change.  Answered her questions re Fiasp and recommended she change to this ultras fast acting insulin\par Continue current basal settings, work on focussing with lifestyle changes.  Decrease bedtime correction doses to 1-2 units rather than 3 units as she seems to be over-correcting leading to episodes of hypoglycemia and rebound highs\par She has also met with RD\par Declines metformin at this time.  I again discussed the benefits this could afford her including weight loss and decreased insulin requirements \par Could consider GLP1 as another non-insulin alternative if necessary in the future\par \par Follow up 2 wks via telehealth\par  [Importance of Diet and Exercise] : importance of diet and exercise to improve glycemic control, achieve weight loss and improve cardiovascular health [Hypoglycemia Management] : hypoglycemia management [Action and use of Insulin] : action and use of short and long-acting insulin [Insulin Self-Administration] : insulin self-administration

## 2021-01-26 NOTE — HISTORY OF PRESENT ILLNESS
[Home] : at home, [unfilled] , at the time of the visit. [Medical Office: ([facility])___] : at the medical office located in  [Verbal consent obtained from patient] : the patient, [unfilled] [FreeTextEntry1] : The duration of this teleheatlh visit was 29 minutes.\par Diabetes diagnosed in 2011.   Using Omnipod, dexcom\par Feels that she has not been doing as well with her diabetes recently.  Brother had COVID and dog had seizures so has been particularly stressed.  She has also been having more lows and is not sure of the cause.  On one occasion, may have bolused and forgot to eat.  Feels that she needs to give bolus 30 mins before a meal in order for it to work, so I had prescribed Fiasp but she hasn't picked it up yet because had some reservations about switching to a new insulin.\par She was having lows yesterday of 40 and was unable to get BG up so ripped off Omnipod\par  Dinner is around 6-6:30P\par She is correcting with ~3U before bed if sugar is ~200\par Noting highs around 3 AM\par She did meet with RD.\par No hypoglycemia symptoms until sugars in the 40s range.\par \par Omnipod settings:\par basal \par 12a-3:30P 1.30\par 3:30-6:30P 1.35\par 6:30P-12a 1.3\par IC ratio 6\par correction 25\par \par Total daily basal is 31.35 units.  Yesterday total insulin dose was 66 units.  Usually somewhere between 65-75 units\par \par Has not tried metformin because she is nervous about the health of her heart and her kidneys .  She did fill Rx but did not take first tab

## 2021-02-08 ENCOUNTER — RX RENEWAL (OUTPATIENT)
Age: 36
End: 2021-02-08

## 2021-02-17 ENCOUNTER — APPOINTMENT (OUTPATIENT)
Dept: ENDOCRINOLOGY | Facility: CLINIC | Age: 36
End: 2021-02-17

## 2021-03-10 ENCOUNTER — APPOINTMENT (OUTPATIENT)
Dept: ENDOCRINOLOGY | Facility: CLINIC | Age: 36
End: 2021-03-10

## 2021-04-01 ENCOUNTER — APPOINTMENT (OUTPATIENT)
Dept: DISASTER EMERGENCY | Facility: OTHER | Age: 36
End: 2021-04-01

## 2021-04-27 ENCOUNTER — APPOINTMENT (OUTPATIENT)
Dept: ENDOCRINOLOGY | Facility: CLINIC | Age: 36
End: 2021-04-27
Payer: COMMERCIAL

## 2021-04-27 ENCOUNTER — TRANSCRIPTION ENCOUNTER (OUTPATIENT)
Age: 36
End: 2021-04-27

## 2021-04-27 PROCEDURE — 99215 OFFICE O/P EST HI 40 MIN: CPT | Mod: 95

## 2021-05-04 ENCOUNTER — TRANSCRIPTION ENCOUNTER (OUTPATIENT)
Age: 36
End: 2021-05-04

## 2021-05-04 LAB
25(OH)D3 SERPL-MCNC: 17.7 NG/ML
ACTH-ESO: 13 PG/ML
ALBUMIN SERPL ELPH-MCNC: 4.1 G/DL
ALP BLD-CCNC: 78 U/L
ALT SERPL-CCNC: 12 U/L
ANION GAP SERPL CALC-SCNC: 10 MMOL/L
AST SERPL-CCNC: 42 U/L
BASOPHILS # BLD AUTO: 0.05 K/UL
BASOPHILS NFR BLD AUTO: 0.6 %
BILIRUB SERPL-MCNC: 0.4 MG/DL
BUN SERPL-MCNC: 14 MG/DL
CALCIUM SERPL-MCNC: 9.1 MG/DL
CHLORIDE SERPL-SCNC: 105 MMOL/L
CHOLEST SERPL-MCNC: 192 MG/DL
CO2 SERPL-SCNC: 20 MMOL/L
CORTIS SERPL-MCNC: 7 UG/DL
CREAT SERPL-MCNC: 0.81 MG/DL
CREAT SPEC-SCNC: 255 MG/DL
EOSINOPHIL # BLD AUTO: 0.23 K/UL
EOSINOPHIL NFR BLD AUTO: 2.6 %
ESTIMATED AVERAGE GLUCOSE: 200 MG/DL
GLUCOSE SERPL-MCNC: 99 MG/DL
HBA1C MFR BLD HPLC: 8.6 %
HCT VFR BLD CALC: 41.1 %
HDLC SERPL-MCNC: 49 MG/DL
HGB BLD-MCNC: 12.3 G/DL
IMM GRANULOCYTES NFR BLD AUTO: 0.5 %
LDLC SERPL CALC-MCNC: 127 MG/DL
LYMPHOCYTES # BLD AUTO: 2.49 K/UL
LYMPHOCYTES NFR BLD AUTO: 28.4 %
MAN DIFF?: NORMAL
MCHC RBC-ENTMCNC: 24.6 PG
MCHC RBC-ENTMCNC: 29.9 GM/DL
MCV RBC AUTO: 82.4 FL
MICROALBUMIN 24H UR DL<=1MG/L-MCNC: 2.4 MG/DL
MICROALBUMIN/CREAT 24H UR-RTO: 10 MG/G
MONOCYTES # BLD AUTO: 0.52 K/UL
MONOCYTES NFR BLD AUTO: 5.9 %
NEUTROPHILS # BLD AUTO: 5.45 K/UL
NEUTROPHILS NFR BLD AUTO: 62 %
NONHDLC SERPL-MCNC: 143 MG/DL
PLATELET # BLD AUTO: 227 K/UL
POTASSIUM SERPL-SCNC: 5.8 MMOL/L
PROT SERPL-MCNC: 7.3 G/DL
RBC # BLD: 4.99 M/UL
RBC # FLD: 14.5 %
SODIUM SERPL-SCNC: 135 MMOL/L
T4 FREE SERPL-MCNC: 0.9 NG/DL
TRIGL SERPL-MCNC: 78 MG/DL
TSH SERPL-ACNC: 6.07 UIU/ML
WBC # FLD AUTO: 8.78 K/UL

## 2021-05-04 RX ORDER — ERGOCALCIFEROL 1.25 MG/1
1.25 MG CAPSULE, LIQUID FILLED ORAL
Qty: 12 | Refills: 0 | Status: ACTIVE | COMMUNITY
Start: 2018-07-18 | End: 1900-01-01

## 2021-05-04 NOTE — ASSESSMENT
[Long Term Vascular Complications] : long term vascular complications of diabetes [Importance of Diet and Exercise] : importance of diet and exercise to improve glycemic control, achieve weight loss and improve cardiovascular health [Action and use of Insulin] : action and use of short and long-acting insulin [Self Monitoring of Blood Glucose] : self monitoring of blood glucose [FreeTextEntry1] : Diabetes, type 1, suboptimal control.  goal A1c 7.0 or less\par She remains unfocussed with lifestyle management of diabetes and I discussed with her again today that something must change as she is at high risk for development of complications from diabetes including but not limited to renal failure and nerve damage.  I have now known her for three years and her diabetes has remained uncontrolled during that time.  Currently, she reports she has been "grazing" throughout the day which has led to difficulty bolusing and periods of hyperglycemia.  She agrees that her poor glycemic control is 100% behavioral at this time.  Discussed with her something must change: her insulin dose, her other non-insulin diabetes medications or her behavior.  She will continue to work on behavior change.  We discussed healthy, very low carb  snacks to choose throughout the day if she feels she needs a snack.  We also discussed adding metformin v GLP1 agonist.  At this time, she is most willing to proceed with a GLP1 agonist.  I think this is a good option because of weight loss and appetite suppressing effects.\par I recommend initiation of Rx with Trulicity, which is a GLP-1 analog approved by the FDA for treatment of T2DM as monotherapy or in combination with other oral agents. Its clinical effects include augmented glucose-mediated insulin secretion, slowed gastric emptying, suppression of elevated glucagon levels, and weight loss.\par Counseled pt regarding possible adverse effects, including nausea, vomiting and diarrhea (common) and rare side effect of necrotizing or hemorrhagic pancreatitis (less than 1:10,000).\par I have confirmed that there are no contraindications to therapy, such as known gastroparesis, hx of pancreatitis, eGFR <30 mL/min ([place] only), severe renal or hepatic impairment, or personal or family hx of MTC or MEN2.\par Instructed patient to start:\par Trulicity 0.75 mg weekly and will titrate as indicated.  Rx sent.\par -She remains hesitant about starting metformin so will hold off for now.\par -We had decided to transition to Fiasp and dosing at the onset of her meal to avoid hypoglycemia from early bolusing, but she has not yet made this change because she states a prior auth is needed.  I will work on that.\par Continue current basal settings\par She has also met with RD\par I emailed omnipod rep to inquire about pod insertion strategies to minimize bruising.\par She is due to check labs and I emailed her requisitions to complete labs locally\par \par Hypothyroidism\par Check TSH given cold intolerance although this is localized to extremities and probably has more to do with diabetes\par Continue current levothyroxine dose for now\par \par Vitamin D deficiency\par Check vitamin D level\par \par RTC 1 month\par  [FreeTextEntry2] : I also spent time answering her questions regarding her specific BG goals throughout the day, best non-insulin medication choices for her, and risk of her daughter lindsey diabetes

## 2021-05-04 NOTE — HISTORY OF PRESENT ILLNESS
[Home] : at home, [unfilled] , at the time of the visit. [Medical Office: ([facility])___] : at the medical office located in  [Verbal consent obtained from patient] : the patient, [unfilled] [FreeTextEntry1] : The duration of this teleheatlh visit was 47 minutes.\par Diabetes diagnosed in 2011.   Using Omnipod, dexcom\par Feels that she has not been doing as well with her diabetes recently. Agrees that it is completely behavioral as she is "grazing" all day.  May have something small like three potato chips or a strawberry and it sends her sugar davis high.\par BGs not bad over night but starts grazing at noon so they are high from that time on\par Asking about bruising to abdomen at pod insertion sites.  Noting that toes and fingers are colder.  No burning or tingling.\par Dad with DM not doing well recently, kidneys declining.\par Pod ran out last night\par Fiasp wasn't covered so hasn't been able to get it.  May need PA\par \par Omnipod settings:\par basal \par 12a-3:30P 1.30\par 3:30-6:30P 1.35\par 6:30P-12a 1.3\par IC ratio 6\par correction 25\par \par Has not tried metformin because she is nervous about the health of her heart and her kidneys .  She did fill Rx but did not take first tab

## 2021-05-04 NOTE — ADDENDUM
[FreeTextEntry1] : 5/4/2021 JT Reviewed lab results and emailed patient.  Note to patient pasted below\par Corby Ponce!  Got your lab results!  \par A1C is currently 8.6%, not bad when looking at your trends.  Average corresponding blood sugar is 200 mg/dL with this A1C.\par Your cholesterol is a bit elevated.  Bad cholesterol (LDL) is 127, goal is below 99.  Your good cholesterol (HDL) is a bit low.  \par I don’t think you need cholesterol medication now but please see diet and exercise recommendations at the end of the email.\par Vitamin D is still low.  I sent an Rx for the ergocalciferol 50K units (Rx strength vitamin D) TO [facility].  You should take one capsule once weekly for 12 weeks then transition to over the counter Vitamin D3 2000 units daily for maintenance.\par Your thyroid levels are a bit off.  TSH is 6.07 (normal is 0.2-4.2 and higher numbers mean you do not have enough thyroid hormone in your body).  Have you missed any levothyroxine doses recently?  This could elevate the result.  If you’ve missed any, would hold the course with current dose.  If not, we can increase slightly by half tab weekly (so you would take 112 mcg one tab daily as you currently do Monday-Saturday, but on Sundays only, take 1.5 tabs).\par Talk to you soon\par Odilon\par \par •	To reduce ASCVD (cardiovascular) risk in all patients:\par •	- A diet emphasizing intake of vegetables, fruits, legumes, nuts, whole grains, and fish is recommended (I, B-R). A diet containing reduced amounts of cholesterol and sodium can be beneficial (IIa, B-NR).\par •	- Replacement of saturated fat with dietary mono- and poly-unsaturated fats can be beneficial (IIa, B-NR).\par •	- Minimizing the intake of trans fats, processed meats, refined carbohydrates, and sweetened beverages as part of a heart healthy diet is reasonable (IIa, B-NR).\par •	- Engage in at least 150 minutes per week of accumulated moderate intensity or 75 minutes per week of vigorous intensity aerobic physical activity (or an equivalent combination of moderate and vigorous activity) (I, B-NR). This includes adults with type 2 diabetes mellitus (I, A).\par •	- Decrease sedentary behavior (IIb, C-LD).\par

## 2021-05-04 NOTE — DATA REVIEWED
[FreeTextEntry1] : I reviewed CGM tracings from 4/14-4/27\par Average  mg/dL, SD 74 mg/dL\par 41% in range, 58% high, 1% low\par Sensor usage 93%\par Pattern of highs 3 PM - midnight

## 2021-05-18 ENCOUNTER — APPOINTMENT (OUTPATIENT)
Dept: ENDOCRINOLOGY | Facility: CLINIC | Age: 36
End: 2021-05-18

## 2021-05-21 ENCOUNTER — RX RENEWAL (OUTPATIENT)
Age: 36
End: 2021-05-21

## 2021-06-02 ENCOUNTER — APPOINTMENT (OUTPATIENT)
Dept: ENDOCRINOLOGY | Facility: CLINIC | Age: 36
End: 2021-06-02
Payer: COMMERCIAL

## 2021-06-02 PROCEDURE — 99214 OFFICE O/P EST MOD 30 MIN: CPT | Mod: 95

## 2021-06-02 NOTE — ASSESSMENT
[FreeTextEntry1] : Diabetes, type 1, suboptimal control.  goal A1c 7.0 or less\par Her most recent A1C checked 4/29/2020 was 8.6% which is not at goal but is better compared with prior A1Cs for patient.  \par Currently using Omnipod (humalog) and dexcom.  At last visit with me, had agreed to change Humalog to Fiasp given delay in onset of humalog action and also add GLP1 Trulicity.  She has been resistant to adding metformin.  Prior auths were needed but have been obtained for some time.  She tells me today she has not been able to get either Rx from the pharmacy.  When I called [facility] following the visit, both Rxs are able to be processed.  Pharmacist will order both Rxs to be available for pickup tomorrow.  I emailed patient following visit relaying this info to her.\par Offered to increase basal settings.  At this time, she wishes to continue current basal settings and proceed with addition of Trulicity and change to FIasp as above.\par She has also met with RD\par LDL is above goal ( April 2021).  I provided recommendations for cholesterol lowering diet and exercise recs.  No statin recommended at this time.\par \par Hypothyroidism\par Currently taking levothyroxine 112 mcg daily.  Most recent TSH is minimally elevated at 6.07 on this dose, however, patient believes she missed two doses of levothyroxine right around the time labs were checked\par Continue same dose of levothyroxine \par \par Vitamin D deficiency\par Vitamin  D 17.7 in April 2021 so I prescribed ergocalciferol 50K units weekly x 12 weeks then transition to OTC Vitamin D.  Patient states today she is taking ergocalciferol \par \par RTC 3 weeks as scheduled with Dr Morataya \par

## 2021-06-02 NOTE — HISTORY OF PRESENT ILLNESS
[Home] : at home, [unfilled] , at the time of the visit. [Medical Office: ([facility])___] : at the medical office located in  [Verbal consent obtained from patient] : the patient, [unfilled] [FreeTextEntry1] : 35 year old female with T1DM returns today for telehealth follow up.\par Diabetes diagnosed in 2011.   Using Omnipod, dexcom\par I got PA for Fiasp but she has not picked up Rx.  Says that her pharmacy tells her it's not covered.  She has not picked up Rx for Trulicity either.  Still complaining that prandial insulin takes ~20 minutes to take effect.\par She reports that she has been able to lose some weigh twith walking.\par Has questions about her lab results today.  States she did get my email communication re results interpretation.  Believes she missed two doses of LT4 around the time labs were drawn.  She did  Rx for vitamin D.\par \par Omnipod settings:\par basal \par 12a-3:30P 1.30\par 3:30-6:30P 1.35\par 6:30P-12a 1.3\par IC ratio 6\par correction 25\par \par I rev dexcom clarity report May 20-June 2, 2021\par Average BG is 191 mg/dL, SD 69 mg/dL\par 43% time in range.  55% above 180 mg/dL, 1% low\par She has a pattern of highs 7:05-11:35 PM\par Looks like she is bolusing too generously based on daily graphs \par Sensor usage 12/14 days\par \par Has not tried metformin because she is nervous about the health of her heart and her kidneys .  She did fill Rx but did not take first tab

## 2021-06-24 ENCOUNTER — APPOINTMENT (OUTPATIENT)
Dept: ENDOCRINOLOGY | Facility: CLINIC | Age: 36
End: 2021-06-24
Payer: COMMERCIAL

## 2021-06-24 VITALS — WEIGHT: 180 LBS | BODY MASS INDEX: 34.01 KG/M2

## 2021-06-24 PROCEDURE — 99214 OFFICE O/P EST MOD 30 MIN: CPT | Mod: 25,95

## 2021-06-25 NOTE — ASSESSMENT
[FreeTextEntry1] : Diabetes, uncontrolled.  Obesity BMI 34.  Hyper cholesterolemia.\par Hyperglycemia due to dietary indiscretion, likely undercounting her carbs.\par Advised to reduce carbs in diet, to < 60g (goal 45g carb/meal) and I recommended that she measure her foods, read labels to re-educate herself on carb portions. I recommended to avoid night eating, which results in hyperglycemia that carries over into the next day.  \par Keep same pump settings but I emailed her changes to her Dexcom alert settings (increase low and high alert settings).\par Recommended starting statin, and she is agreeable.\par RTO 3 months

## 2021-06-25 NOTE — HISTORY OF PRESENT ILLNESS
[FreeTextEntry1] : Sugars harder to control, they go high and stay high.   Fasting glucose usually ok, below 150.\par She feels insulin takes a long time to work, about 45 min. Changed to Fiasp 2 days ago.\par Recently returned to work, increased stress.\par Eating 1-2 meals and then "picking" throughout the day,  whatever is in the house and whatever daughter is eating.\par Usually taking 10-12 units for meal bolus.\par recently saw ophtho, no DR\par occasional tingling in toes\par periods regular, not planning pregnancy in the future\par labs from 4/21 reviewed:   A1c 8.6%,  \par \par Dexcom, CGM report  reviewed: 30d average 200, 41% in range. 57% high, 1% low, 93% CGM use\par Daily graph showing prolonged hyperglycemia after many meals.  Many nights with high sugars carrying over to the following day.  There was one good weekend, June 5-6, where sugars were good.\par \par \par Omnipod settings:  Fiasp CSII\par basal 1.35 x 24h\par IC ratio 6\par correction 25

## 2021-06-25 NOTE — REASON FOR VISIT
[Home] : at home, [unfilled] , at the time of the visit. [Medical Office: ([facility])___] : at the medical office located in  [Verbal consent obtained from patient] : the patient, [unfilled] [Follow - Up] : a follow-up visit [DM Type 1] : DM Type 1

## 2021-06-25 NOTE — PHYSICAL EXAM
[Alert] : alert [No Acute Distress] : no acute distress [Normal Affect] : the affect was normal [Normal Mood] : the mood was normal [de-identified] : 91 on Dexcom

## 2021-06-25 NOTE — DATA REVIEWED
[FreeTextEntry1] : 4/21: A1c 8.6%, tot chol 192, trig 78, HDL 49, , urine microalbumin/cr 10, TSH 6.07, 25D 17.7\par 2/21: am cortisol 7.0\par 2/20 (pt copies): A1c 8.6%, TSH 3.2, FSH 2.1\par 1/20: A1c 9.3%, tot chol 199, trig 98, HDL 61, . TSH 2.06, urine microalbumin < 1.2, 25D 13.4\par 5/19: A1c 9.8%, tot chol 203, trig 244, HDL 53, , TSH 0.47, 25D 7.4\par 10/18: C peptide < 0.1, glucose 160,  CRISTIANE  0.24, insulin Ab 9.6

## 2021-07-14 ENCOUNTER — APPOINTMENT (OUTPATIENT)
Dept: ENDOCRINOLOGY | Facility: CLINIC | Age: 36
End: 2021-07-14

## 2021-09-13 ENCOUNTER — APPOINTMENT (OUTPATIENT)
Dept: ENDOCRINOLOGY | Facility: CLINIC | Age: 36
End: 2021-09-13

## 2021-09-30 DIAGNOSIS — Z78.9 OTHER SPECIFIED HEALTH STATUS: ICD-10-CM

## 2021-09-30 DIAGNOSIS — Z84.0 FAMILY HISTORY OF DISEASES OF THE SKIN AND SUBCUTANEOUS TISSUE: ICD-10-CM

## 2021-09-30 RX ORDER — INSULIN GLARGINE 300 U/ML
300 INJECTION, SOLUTION SUBCUTANEOUS
Qty: 1 | Refills: 0 | Status: DISCONTINUED | COMMUNITY
Start: 2019-05-21 | End: 2021-09-30

## 2021-09-30 RX ORDER — DULAGLUTIDE 0.75 MG/.5ML
0.75 INJECTION, SOLUTION SUBCUTANEOUS
Qty: 1 | Refills: 1 | Status: DISCONTINUED | COMMUNITY
Start: 2021-04-27 | End: 2021-09-30

## 2021-10-05 RX ORDER — INSULIN ASPART INJECTION 100 [IU]/ML
100 INJECTION, SOLUTION SUBCUTANEOUS DAILY
Refills: 5 | Status: DISCONTINUED | COMMUNITY
Start: 2021-01-13 | End: 2021-10-05

## 2021-10-06 ENCOUNTER — APPOINTMENT (OUTPATIENT)
Dept: CARDIOLOGY | Facility: CLINIC | Age: 36
End: 2021-10-06

## 2021-10-07 ENCOUNTER — NON-APPOINTMENT (OUTPATIENT)
Age: 36
End: 2021-10-07

## 2021-12-20 ENCOUNTER — RX RENEWAL (OUTPATIENT)
Age: 36
End: 2021-12-20

## 2021-12-20 RX ORDER — INSULIN LISPRO 100 [IU]/ML
100 INJECTION, SOLUTION INTRAVENOUS; SUBCUTANEOUS
Qty: 30 | Refills: 3 | Status: ACTIVE | COMMUNITY
Start: 2020-05-20 | End: 1900-01-01

## 2021-12-23 ENCOUNTER — APPOINTMENT (OUTPATIENT)
Dept: ENDOCRINOLOGY | Facility: CLINIC | Age: 36
End: 2021-12-23

## 2022-01-25 ENCOUNTER — NON-APPOINTMENT (OUTPATIENT)
Age: 37
End: 2022-01-25

## 2022-02-14 ENCOUNTER — APPOINTMENT (OUTPATIENT)
Dept: ENDOCRINOLOGY | Facility: CLINIC | Age: 37
End: 2022-02-14

## 2022-02-16 ENCOUNTER — APPOINTMENT (OUTPATIENT)
Dept: OBGYN | Facility: CLINIC | Age: 37
End: 2022-02-16

## 2022-02-25 ENCOUNTER — APPOINTMENT (OUTPATIENT)
Dept: ENDOCRINOLOGY | Facility: CLINIC | Age: 37
End: 2022-02-25
Payer: COMMERCIAL

## 2022-02-25 PROCEDURE — 99214 OFFICE O/P EST MOD 30 MIN: CPT | Mod: 95

## 2022-02-25 RX ORDER — INSULIN LISPRO-AABC 100 [IU]/ML
100 INJECTION, SOLUTION INTRAVENOUS; SUBCUTANEOUS
Qty: 2 | Refills: 3 | Status: DISCONTINUED | COMMUNITY
Start: 2021-10-05 | End: 2022-02-25

## 2022-02-25 NOTE — REASON FOR VISIT
[Home] : at home, [unfilled] , at the time of the visit. [Medical Office: ([facility])___] : at the medical office located in  [Follow - Up] : a follow-up visit [DM Type 1] : DM Type 1

## 2022-02-25 NOTE — HISTORY OF PRESENT ILLNESS
[FreeTextEntry1] : Clarity report generated:  14d 194, 45% in range, 49% high, 6% low, 71% CGM use\par Daily graph showing some hypoglycemia in the early am.   Prolonged hyperglycemia in afternoon, corresponding to eating.\par Says she is a "" and can graze eating in afternoon.  Sugars high often for 6 hours in afternoon or night.\par usually bolusing 10-12 units for meals.  Lunch is usually a sandwich;  has rice for dinner.\par even when she is eating mostly protein and vegetables, sugars still increase afterwards.\par She doesn't always wake up when sugars are low during the night.\par Fiasp worked faster, but needed much more, had to change pods more frequently.  Sometimes has to wait 40 minutes to feel Humalog taking effect.\par occasional tingling in toes\par has not seen ophtho recently (last visit 1 year ago). \par She stopped taking thyroxine 2.5 weeks ago b/c she was feeling shaky and that has resolved since she stopped.\par \par Omnipod settings:  Humalog CSII\par basal 1.35 x 24h\par IC ratio 6\par correction 25

## 2022-02-25 NOTE — ASSESSMENT
[FreeTextEntry1] : Diabetes, uncontrolled.  Obesity BMI 34.  Hyper cholesterolemia.\par Hyperglycemia due to dietary indiscretion, likely undercounting her carbs.\par Reduce basal to 1.25 units/hr to prevent am hypoglycemia \par Recommended using extended bolus for grazing/prolonged eating.  Start with 12-16 units and bolus over 5-6 hours (or the length of time she is eating).   If mostly eating protein/veg (no/little carb), she should enter 12g carbs for the meal (then will get 2 units). \par will send [facility] form to check A1c, urine microalbumin \par ophtho recommended.\par \par Hashimoto's\par Recommended to restart thyroxine at half tab per day x 1 week, then alternate half tab with full tab every other day, and then 1 tab/day.   If tremors recur with full tablet, she may need a lower dose of levothyroxine.\par RTO 3 months

## 2022-03-15 ENCOUNTER — APPOINTMENT (OUTPATIENT)
Dept: OBGYN | Facility: CLINIC | Age: 37
End: 2022-03-15

## 2022-05-20 RX ORDER — INSULIN ASPART INJECTION 100 [IU]/ML
100 INJECTION, SOLUTION SUBCUTANEOUS
Qty: 1 | Refills: 0 | Status: COMPLETED | COMMUNITY
Start: 2022-05-20 | End: 2022-06-19

## 2022-06-02 ENCOUNTER — RX RENEWAL (OUTPATIENT)
Age: 37
End: 2022-06-02

## 2022-06-21 RX ORDER — PEN NEEDLE, DIABETIC 29 G X1/2"
32G X 4 MM NEEDLE, DISPOSABLE MISCELLANEOUS
Qty: 4 | Refills: 3 | Status: ACTIVE | COMMUNITY
Start: 2022-06-21 | End: 1900-01-01

## 2022-06-21 RX ORDER — INSULIN GLARGINE 100 [IU]/ML
100 INJECTION, SOLUTION SUBCUTANEOUS
Qty: 2 | Refills: 0 | Status: ACTIVE | COMMUNITY
Start: 2022-06-21 | End: 1900-01-01

## 2022-06-27 RX ORDER — INSULIN LISPRO 100 [IU]/ML
100 INJECTION, SOLUTION INTRAVENOUS; SUBCUTANEOUS
Qty: 80 | Refills: 1 | Status: ACTIVE | COMMUNITY
Start: 2022-05-20 | End: 1900-01-01

## 2022-06-27 RX ORDER — INSULIN PMP CART,AUT,G6/7,CNTR
EACH SUBCUTANEOUS
Qty: 1 | Refills: 0 | Status: ACTIVE | COMMUNITY
Start: 2022-06-27 | End: 1900-01-01

## 2022-06-29 ENCOUNTER — NON-APPOINTMENT (OUTPATIENT)
Age: 37
End: 2022-06-29

## 2022-08-06 ENCOUNTER — RX RENEWAL (OUTPATIENT)
Age: 37
End: 2022-08-06

## 2022-08-06 RX ORDER — INSULIN LISPRO 100 [IU]/ML
100 INJECTION, SOLUTION INTRAVENOUS; SUBCUTANEOUS
Qty: 1 | Refills: 1 | Status: ACTIVE | COMMUNITY
Start: 2022-06-21 | End: 1900-01-01

## 2022-08-08 ENCOUNTER — NON-APPOINTMENT (OUTPATIENT)
Age: 37
End: 2022-08-08

## 2022-08-22 ENCOUNTER — NON-APPOINTMENT (OUTPATIENT)
Age: 37
End: 2022-08-22

## 2022-09-21 ENCOUNTER — APPOINTMENT (OUTPATIENT)
Dept: ENDOCRINOLOGY | Facility: CLINIC | Age: 37
End: 2022-09-21

## 2022-09-26 ENCOUNTER — RX RENEWAL (OUTPATIENT)
Age: 37
End: 2022-09-26

## 2022-12-05 ENCOUNTER — RX RENEWAL (OUTPATIENT)
Age: 37
End: 2022-12-05

## 2022-12-20 ENCOUNTER — RX RENEWAL (OUTPATIENT)
Age: 37
End: 2022-12-20

## 2023-01-09 ENCOUNTER — RX RENEWAL (OUTPATIENT)
Age: 38
End: 2023-01-09

## 2023-02-15 ENCOUNTER — RX RENEWAL (OUTPATIENT)
Age: 38
End: 2023-02-15

## 2023-02-28 ENCOUNTER — APPOINTMENT (OUTPATIENT)
Dept: OBGYN | Facility: CLINIC | Age: 38
End: 2023-02-28
Payer: COMMERCIAL

## 2023-02-28 ENCOUNTER — RX RENEWAL (OUTPATIENT)
Age: 38
End: 2023-02-28

## 2023-02-28 VITALS
WEIGHT: 190 LBS | SYSTOLIC BLOOD PRESSURE: 106 MMHG | HEIGHT: 61 IN | HEART RATE: 93 BPM | BODY MASS INDEX: 35.87 KG/M2 | DIASTOLIC BLOOD PRESSURE: 72 MMHG

## 2023-02-28 DIAGNOSIS — Z01.419 ENCOUNTER FOR GYNECOLOGICAL EXAMINATION (GENERAL) (ROUTINE) W/OUT ABNORMAL FINDINGS: ICD-10-CM

## 2023-02-28 PROCEDURE — 99385 PREV VISIT NEW AGE 18-39: CPT

## 2023-02-28 NOTE — PLAN
[FreeTextEntry1] : 37 year old pt presents for annual, denies complaints \par - Routine breast and pelvic exam done, wnl \par - Discussed monthly BSE \par - Pap/hpv\par -mammogram at 38\par - RTO 1 year for annual \par Candis Bass MD

## 2023-03-01 LAB — HPV HIGH+LOW RISK DNA PNL CVX: NOT DETECTED

## 2023-03-03 LAB — CYTOLOGY CVX/VAG DOC THIN PREP: NORMAL

## 2023-03-06 ENCOUNTER — RX RENEWAL (OUTPATIENT)
Age: 38
End: 2023-03-06

## 2023-04-26 ENCOUNTER — RX RENEWAL (OUTPATIENT)
Age: 38
End: 2023-04-26

## 2023-05-22 ENCOUNTER — APPOINTMENT (OUTPATIENT)
Dept: ENDOCRINOLOGY | Facility: CLINIC | Age: 38
End: 2023-05-22

## 2023-05-24 ENCOUNTER — RX RENEWAL (OUTPATIENT)
Age: 38
End: 2023-05-24

## 2023-05-25 NOTE — HISTORY OF PRESENT ILLNESS
Discussed message below with Trevon. He would like to speak with the nurse regarding his ankle and his pain he is having. Please call patient to discuss. 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 Subsequently with HD today and drop in pressures, transferred to MICU for line placement and better accuracy of blood pressure control. Patient has remained hemodynamically stable, off pressors.    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    Cardio  Patient w persistently low blood pressures, w 80s/40s during RR and inability to accurately measure BP due to diffuse edema during RR  Less concern for shock on differential given no infectious etiology, was requiring low dose levophed intermittently, but patient known to have baseline low blood pressure.  - Arterial Line removed 5/24/23.  - Weaned off levophed  - Midodrine 40mg q 8, home medication  - C/w HD as needed. HD today 5/25.  - Florinef .2mg qd.    #HfrEf, non-ischemic cardiomyopathy  - history of HfpEF, TTE 5/5/23, normal RV and LV function  - On No HF meds  - C.w statin      Pulmonary  - Patient w no pulmonology history  - good airway control, controlling secretions  - Continue to monitor secretions.    Renal  - ESRD w Dialysis, rapid response during HD session  - C/w HD as tolerated  - renvela discontinued as pt w normal P.    GI  #Stercoral Ulcers  - Came in w GI bleed deemed to be 2/2 to constipation and stercoral ulcers  - C/w PPi IV BID  - C/w golytely prep as laxative  - Daily KUB  - F/u abdominal US w doppler, no evidence of portal HTN  - F/u GI recs, as per HCP and pt GOC, no role for egd this admission  - Active T and S, Keep HgB >7  - Caution w NSAIDS, prefer GANN-2 inhibitors    ID  -Patient w F to 101.1 on 5/24/23, and concomitant afib w RVR, resolved after defervescence  - Afebrile currently  - F/u BCx, F/u breast wound culture    Heme  - HgB stable, currently, came in at 6.2  - See above for GI    Endo  5/21 received d10 bolus for glucose 66, on q4 glucose checks, then started d10 gtt at 30 cc/hr  - Off gtt now, re-initiate as necessary  - FSG q6.    Prophylaxis:  F: Off IVF currently.  E: Replete cautiously given ESRD  N: Soft and Bite sized (renal diet)  DVT: none as previous GIB    Lines: No lines.  Peripheral IV to be placed-> Foot IV.   No Wu. [N] : Patient does not use contraception [Y] : Positive pregnancy history [FreeTextEntry1] : 37 year old patient G4, , LMP 01/01/2023, presents for annual, denies complaints. Pt has been to OBGYN before and wants to establish care. Last visit approx 8 years ago, states she was told she has HPV.  \par DM1, on medication, today denies any gyn complaints, nervous about exam. Denies hx of breast cancer in family, hx of VTE, clots. \par GYN HX:  regular menses, normal flow, denies complaints. History of abnormal pap smear (HPV pos?) . Denies receiving gardasil vaccine. \par \par MED HX: Hypothyroidism, taking meds. Denies Hx of DVT \par \par FAM HX: lupus, blood clots, father-heart failure \par \par  [LMPDate] : 01/01/2023 [PGHxTotal] : 3 [[facility] CenterxFulerm] : 1 [[facility] Cliniciving] : 1 [PGHxABInduced] : 1 [PGHxABSpont] : 1

## 2023-06-01 ENCOUNTER — RX RENEWAL (OUTPATIENT)
Age: 38
End: 2023-06-01

## 2023-06-22 ENCOUNTER — RX RENEWAL (OUTPATIENT)
Age: 38
End: 2023-06-22

## 2023-06-29 ENCOUNTER — RX RENEWAL (OUTPATIENT)
Age: 38
End: 2023-06-29

## 2023-07-10 ENCOUNTER — APPOINTMENT (OUTPATIENT)
Dept: ENDOCRINOLOGY | Facility: CLINIC | Age: 38
End: 2023-07-10

## 2023-07-26 ENCOUNTER — RX RENEWAL (OUTPATIENT)
Age: 38
End: 2023-07-26

## 2023-08-23 ENCOUNTER — APPOINTMENT (OUTPATIENT)
Dept: ENDOCRINOLOGY | Facility: CLINIC | Age: 38
End: 2023-08-23
Payer: COMMERCIAL

## 2023-08-23 VITALS
HEART RATE: 80 BPM | BODY MASS INDEX: 36.85 KG/M2 | DIASTOLIC BLOOD PRESSURE: 80 MMHG | WEIGHT: 195 LBS | SYSTOLIC BLOOD PRESSURE: 120 MMHG

## 2023-08-23 LAB
GLUCOSE BLDC GLUCOMTR-MCNC: 146
HBA1C MFR BLD HPLC: 8.3

## 2023-08-23 PROCEDURE — 83036 HEMOGLOBIN GLYCOSYLATED A1C: CPT | Mod: QW

## 2023-08-23 PROCEDURE — 82962 GLUCOSE BLOOD TEST: CPT

## 2023-08-23 PROCEDURE — 95251 CONT GLUC MNTR ANALYSIS I&R: CPT

## 2023-08-23 PROCEDURE — 99215 OFFICE O/P EST HI 40 MIN: CPT | Mod: 25

## 2023-08-23 RX ORDER — TIRZEPATIDE 2.5 MG/.5ML
2.5 INJECTION, SOLUTION SUBCUTANEOUS
Refills: 0 | Status: ACTIVE | COMMUNITY
Start: 2023-08-23

## 2023-08-23 NOTE — HISTORY OF PRESENT ILLNESS
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 Is on Omnipod and G6, however does not use an automatic mode - states her glucose does not react fast enough to corrections.  Last optho - over 2 years ago. Podiatrist - due  CGM report downloaded - TIR - 35%; very high - 39% Pump rates  Basal - 1.35 X 24 h I/C -1/6 CF -1:25  POC A1C - 8.3% POC BG - 146 mg/dl  Lives with father and 10 yo daughter. Is not eating 3 meals a day, but is snacking a lot.

## 2023-08-23 NOTE — PHYSICAL EXAM
[Alert] : alert [Obese] : obese [No Acute Distress] : no acute distress [Well Developed] : well developed [Normal Sclera/Conjunctiva] : normal sclera/conjunctiva [No Proptosis] : no proptosis [No Lid Lag] : no lid lag [No Neck Mass] : no neck mass was observed [No LAD] : no lymphadenopathy [Thyroid Not Enlarged] : the thyroid was not enlarged [No Thyroid Nodules] : no palpable thyroid nodules [No Respiratory Distress] : no respiratory distress [No Accessory Muscle Use] : no accessory muscle use [Normal Rate and Effort] : normal respiratory rate and effort [Clear to Auscultation] : lungs were clear to auscultation bilaterally [Normal PMI] : the apical impulse was normal [Normal S1, S2] : normal S1 and S2 [No Murmurs] : no murmurs [Normal Rate] : heart rate was normal [Regular Rhythm] : with a regular rhythm [No Edema] : no peripheral edema [No Stigmata of Cushings Syndrome] : no stigmata of Cushings Syndrome [Normal Gait] : normal gait [No Clubbing, Cyanosis] : no clubbing  or cyanosis of the fingernails [No Involuntary Movements] : no involuntary movements were seen [No Joint Swelling] : no joint swelling seen [No Rash] : no rash [No Skin Lesions] : no skin lesions [Normal] : normal [Full ROM] : with full range of motion [No Tremors] : no tremors [Oriented x3] : oriented to person, place, and time [Kyphosis] : no kyphosis present [Foot Ulcers] : no foot ulcers [Acne] : no acne [Hirsutism] : no hirsutism [Diminished Throughout Both Feet] : normal tactile sensation with monofilament testing throughout both feet

## 2023-08-23 NOTE — ASSESSMENT
[Diabetes Foot Care] : diabetes foot care [Carbohydrate Consistent Diet] : carbohydrate consistent diet [Importance of Diet and Exercise] : importance of diet and exercise to improve glycemic control, achieve weight loss and improve cardiovascular health [Self Monitoring of Blood Glucose] : self monitoring of blood glucose [Retinopathy Screening] : Patient was referred to ophthalmology for retinopathy screening [Weight Loss] : weight loss [FreeTextEntry1] : Diabetes - control is not at target. CGM report discussed in great detail. Will continue the same Tx, but will try to modify diet - referred to RD. GLP-1 and SGLT-2s discussed - will consider if life-style change is not enough. Will see Ophthalmologist. F/U in 6 weeks

## 2023-08-24 RX ORDER — INSULIN PMP CART,AUT,G6/7,CNTR
EACH SUBCUTANEOUS
Qty: 45 | Refills: 3 | Status: ACTIVE | COMMUNITY
Start: 2018-07-12 | End: 1900-01-01

## 2023-09-14 ENCOUNTER — RX RENEWAL (OUTPATIENT)
Age: 38
End: 2023-09-14

## 2023-10-10 ENCOUNTER — APPOINTMENT (OUTPATIENT)
Dept: ENDOCRINOLOGY | Facility: CLINIC | Age: 38
End: 2023-10-10
Payer: COMMERCIAL

## 2023-10-10 PROCEDURE — 99213 OFFICE O/P EST LOW 20 MIN: CPT | Mod: 95

## 2024-01-15 ENCOUNTER — NON-APPOINTMENT (OUTPATIENT)
Age: 39
End: 2024-01-15

## 2024-01-24 ENCOUNTER — APPOINTMENT (OUTPATIENT)
Dept: ENDOCRINOLOGY | Facility: CLINIC | Age: 39
End: 2024-01-24
Payer: COMMERCIAL

## 2024-01-24 PROCEDURE — 99213 OFFICE O/P EST LOW 20 MIN: CPT | Mod: 95

## 2024-01-24 RX ORDER — GLUCAGON INJECTION, SOLUTION 1 MG/.2ML
1 INJECTION, SOLUTION SUBCUTANEOUS
Qty: 1 | Refills: 3 | Status: ACTIVE | COMMUNITY
Start: 2024-01-24 | End: 1900-01-01

## 2024-01-24 NOTE — ASSESSMENT
[Long Term Vascular Complications] : long term vascular complications of diabetes [Hypoglycemia Management] : hypoglycemia management [Self Monitoring of Blood Glucose] : self monitoring of blood glucose [Glucagon Use] : glucagon use [Ketone Testing] : ketone testing [Insulin Self-Administration] : insulin self-administration [FreeTextEntry1] : Diabetes - control is not clear - labs ordered. instructions on use of HypoPen and baqsimi provided; Rx sent Instructed to confirm extreme BG from CGM by FS prior to taking action to avoid hyper and hypoglycemia. Information about T-slim Control IQ provided - pt will think about it. F/U in 1 mo TEB and in 3-4 mo in person. Will refer to nephrologist if kidney function is affested - based on lab results.

## 2024-01-24 NOTE — HISTORY OF PRESENT ILLNESS
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 Is on Omnipod and G6, however does not use an automatic mode - states her glucose does not react fast enough to corrections.  Last optho - over 2 years ago. Podiatrist - due  CGM report downloaded - TIR - 35%; very high - 39% Pump rates  Basal - 1.35 X 24 h I/C -1/6 CF -1:25  POC A1C - 8.3% POC BG - 146 mg/dl  Lives with father and 10 yo daughter. Is not eating 3 meals a day, but is snacking a lot.  10/10/23 MK F/u on DM and hypothyroidism. Got labs done "a while ago", but we did not get the report. Uses Omnipod %, but with Dexcom G7  - stated the auto mode did not work for her. Curtrent basal rated 1.45/h IOB 3 h. Dexcom report downloaded, and patterns analyzed - needs adjustment of basal rate. BG ~ - 198 mg/dl TIR - 40% Hypothyroidism - we need the lab report. No new complains.  1/24/24 Mk TEB f/u.  This visit was provided via telehealth using real-time 2-way audio visual technology. The patient was located at home at the time of the visit. The provider, ROS NARVAEZ, was located at the medical office located in [place], [place] at the time of the visit. The patient and Provider participated in the telehealth encounter. Verbal consent given by the patient. Feels  fine mostly, however c/o being anxious about insulin - uses omnipod and dexcom Dexcom showed a lot of false readings; pt corrected accordingly, became hypoglycemic, dehydrated; went to ER; No DKA; was d/c the same day. is afraid of hypoglycemia. Concerned about her kidney function - states at the hospital GFR was low.

## 2024-02-05 RX ORDER — BLOOD SUGAR DIAGNOSTIC
STRIP MISCELLANEOUS
Qty: 200 | Refills: 5 | Status: ACTIVE | COMMUNITY
Start: 2024-02-05 | End: 1900-01-01

## 2024-02-05 RX ORDER — GLUCAGON 1 MG
1 KIT INJECTION
Qty: 1 | Refills: 3 | Status: ACTIVE | COMMUNITY
Start: 2024-02-05 | End: 1900-01-01

## 2024-02-09 ENCOUNTER — TRANSCRIPTION ENCOUNTER (OUTPATIENT)
Age: 39
End: 2024-02-09

## 2024-02-09 LAB
25(OH)D3 SERPL-MCNC: 19.4 NG/ML
ALBUMIN SERPL ELPH-MCNC: 4.4 G/DL
ALP BLD-CCNC: 78 U/L
ALT SERPL-CCNC: 12 U/L
ANION GAP SERPL CALC-SCNC: 14 MMOL/L
AST SERPL-CCNC: 17 U/L
BILIRUB SERPL-MCNC: 0.6 MG/DL
BUN SERPL-MCNC: 16 MG/DL
CALCIUM SERPL-MCNC: 9 MG/DL
CHLORIDE SERPL-SCNC: 99 MMOL/L
CHOLEST SERPL-MCNC: 167 MG/DL
CO2 SERPL-SCNC: 22 MMOL/L
CREAT SERPL-MCNC: 0.83 MG/DL
CREAT SPEC-SCNC: 210 MG/DL
EGFR: 91 ML/MIN/1.73M2
ESTIMATED AVERAGE GLUCOSE: 217 MG/DL
GLUCOSE SERPL-MCNC: 262 MG/DL
HBA1C MFR BLD HPLC: 9.2 %
HDLC SERPL-MCNC: 43 MG/DL
LDLC SERPL CALC-MCNC: 108 MG/DL
MICROALBUMIN 24H UR DL<=1MG/L-MCNC: 1.8 MG/DL
MICROALBUMIN/CREAT 24H UR-RTO: 9 MG/G
NONHDLC SERPL-MCNC: 125 MG/DL
POTASSIUM SERPL-SCNC: 4.3 MMOL/L
PROT SERPL-MCNC: 7 G/DL
SODIUM SERPL-SCNC: 135 MMOL/L
T3 SERPL-MCNC: 89 NG/DL
T4 FREE SERPL-MCNC: 2.2 NG/DL
TRIGL SERPL-MCNC: 89 MG/DL
TSH SERPL-ACNC: 0.56 UIU/ML

## 2024-02-13 ENCOUNTER — APPOINTMENT (OUTPATIENT)
Dept: ENDOCRINOLOGY | Facility: CLINIC | Age: 39
End: 2024-02-13
Payer: COMMERCIAL

## 2024-02-13 PROCEDURE — 99214 OFFICE O/P EST MOD 30 MIN: CPT

## 2024-02-13 NOTE — HISTORY OF PRESENT ILLNESS
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 Is on Omnipod and G6, however does not use an automatic mode - states her glucose does not react fast enough to corrections.  Last optho - over 2 years ago. Podiatrist - due  CGM report downloaded - TIR - 35%; very high - 39% Pump rates  Basal - 1.35 X 24 h I/C -1/6 CF -1:25  POC A1C - 8.3% POC BG - 146 mg/dl  Lives with father and 10 yo daughter. Is not eating 3 meals a day, but is snacking a lot.  10/10/23 MK F/u on DM and hypothyroidism. Got labs done "a while ago", but we did not get the report. Uses Omnipod %, but with Dexcom G7  - stated the auto mode did not work for her. Curtrent basal rated 1.45/h IOB 3 h. Dexcom report downloaded, and patterns analyzed - needs adjustment of basal rate. BG ~ - 198 mg/dl TIR - 40% Hypothyroidism - we need the lab report. No new complains.  1/24/24 Mk TEB f/u.  This visit was provided via telehealth using real-time 2-way audio visual technology. The patient was located at home at the time of the visit. The provider, ROS NARVAEZ, was located at the medical office located in [place], [place] at the time of the visit. The patient and Provider participated in the telehealth encounter. Verbal consent given by the patient. Feels  fine mostly, however c/o being anxious about insulin - uses omnipod and dexcom Dexcom showed a lot of false readings; pt corrected accordingly, became hypoglycemic, dehydrated; went to ER; No DKA; was d/c the same day. is afraid of hypoglycemia. Concerned about her kidney function - states at the hospital GFR was low.  2/13/24 MK  This visit was provided via telehealth using real-time 2-way audio visual technology. The patient was located at home at the time of the visit. The provider, ROS NARVAEZ, was located at the medical office located in [place], [place] at the time of the visit. The patient and Provider participated in the telehealth encounter. Verbal consent given by the patient. f/u on DM. Is off pump - takes lantus 12 units am and Rapid acting based on i/c-1/6 and cf 1:25. However, BG is high - based on CGM review. States there is a big discrepancy between CGM and FS - about 40 mg/ml. Called  and got new sensors. states modified diet - lost 12 lbs. Saw GI - has endoscopy scheduled. has questions about Tx of hypoglycemia; Would like to re-start pump - needs guidance. Sees therapist for anxiety. Lab results discussed in detail - needs adjustment of Synthroid (might help with anxiety as well).

## 2024-02-13 NOTE — ASSESSMENT
[Hypoglycemia Management] : hypoglycemia management [Self Monitoring of Blood Glucose] : self monitoring of blood glucose [FreeTextEntry1] : Diabetes - uncontrolled. Will re-start pump with the new basal of 1u/h for 24 h  (recalculated based on her current weight). Will use manual mode and BG readings from FS. Tx of hypoglycemia reinforced. Lab results discussed in detail - has vit D 4000 IU - will take qod Hypothyroidism - will lower Synthroid to 100 mcg/day - Rx sent; will repeat labs in 6-8 weeks. F/U in 1-2 weeks to disscuss the progress.

## 2024-03-05 ENCOUNTER — NON-APPOINTMENT (OUTPATIENT)
Age: 39
End: 2024-03-05

## 2024-03-20 ENCOUNTER — APPOINTMENT (OUTPATIENT)
Dept: ENDOCRINOLOGY | Facility: CLINIC | Age: 39
End: 2024-03-20
Payer: COMMERCIAL

## 2024-03-20 VITALS
SYSTOLIC BLOOD PRESSURE: 99 MMHG | BODY MASS INDEX: 31.34 KG/M2 | DIASTOLIC BLOOD PRESSURE: 75 MMHG | WEIGHT: 166 LBS | HEART RATE: 123 BPM | HEIGHT: 61 IN

## 2024-03-20 LAB
GLUCOSE BLDC GLUCOMTR-MCNC: 330
HBA1C MFR BLD HPLC: 10.8

## 2024-03-20 PROCEDURE — 83036 HEMOGLOBIN GLYCOSYLATED A1C: CPT | Mod: QW

## 2024-03-20 PROCEDURE — 82962 GLUCOSE BLOOD TEST: CPT

## 2024-03-20 PROCEDURE — 99215 OFFICE O/P EST HI 40 MIN: CPT

## 2024-03-20 RX ORDER — LEVOTHYROXINE SODIUM 100 UG/1
100 TABLET ORAL
Qty: 90 | Refills: 3 | Status: ACTIVE | COMMUNITY
Start: 2018-07-18 | End: 1900-01-01

## 2024-03-20 NOTE — HISTORY OF PRESENT ILLNESS
[FreeTextEntry1] : 38 y.o. female, not previously seen by me, with a h/o type 1 DM since the age of 25 yrs. The patient was on Omnipod pump but found her BSs to be poorly controlled and switched to Basal/bolus insulin tx. Glucose today is 330 mg/dl, HbA1C 10.8%. She is not aware of having any complications of DM. She has occasional hypoglycemia but never had a hypoglycemia event with LOC. She also has a h/o hypothyroidism, on 0.112 mg T4 daily.

## 2024-03-20 NOTE — ADDENDUM
[FreeTextEntry1] : This time included review of previous records,  lab.  data, discussing findings, differential diagnosis, further testing and evaluation, glycemic, weight, BP and lipid goals; complications of diabetes; weight management - diet, exercise; prevention and management of hypoglycemia; therapeutic options, insulin pump tx,  renewing medications, completing the record.

## 2024-03-20 NOTE — PHYSICAL EXAM
[Alert] : alert [Well Nourished] : well nourished [No Acute Distress] : no acute distress [Well Developed] : well developed [Normal Sclera/Conjunctiva] : normal sclera/conjunctiva [EOMI] : extra ocular movement intact [No Proptosis] : no proptosis [Thyroid Not Enlarged] : the thyroid was not enlarged [Normal Oropharynx] : the oropharynx was normal [No Thyroid Nodules] : no palpable thyroid nodules [No Respiratory Distress] : no respiratory distress [No Accessory Muscle Use] : no accessory muscle use [Clear to Auscultation] : lungs were clear to auscultation bilaterally [Normal S1, S2] : normal S1 and S2 [Normal Rate] : heart rate was normal [Regular Rhythm] : with a regular rhythm [No Edema] : no peripheral edema [Pedal Pulses Normal] : the pedal pulses are present [Normal Bowel Sounds] : normal bowel sounds [Not Tender] : non-tender [Not Distended] : not distended [Soft] : abdomen soft [Normal Anterior Cervical Nodes] : no anterior cervical lymphadenopathy [Normal Posterior Cervical Nodes] : no posterior cervical lymphadenopathy [No Spinal Tenderness] : no spinal tenderness [Spine Straight] : spine straight [No Stigmata of Cushings Syndrome] : no stigmata of Cushings Syndrome [Normal Gait] : normal gait [Normal Strength/Tone] : muscle strength and tone were normal [No Rash] : no rash [Acanthosis Nigricans] : no acanthosis nigricans [Normal Reflexes] : deep tendon reflexes were 2+ and symmetric [No Tremors] : no tremors [Oriented x3] : oriented to person, place, and time

## 2024-03-20 NOTE — ASSESSMENT
[FreeTextEntry1] : DM, type 1, poor glycemic control. Obesity. Hyperlipidemia. Hypothyroidism.  Will move to T-Slim insulin pump. F/U once the arrangements for initiation with the insulin pump tx are made.

## 2024-03-21 ENCOUNTER — APPOINTMENT (OUTPATIENT)
Dept: ENDOCRINOLOGY | Facility: CLINIC | Age: 39
End: 2024-03-21

## 2024-03-29 ENCOUNTER — APPOINTMENT (OUTPATIENT)
Dept: ENDOCRINOLOGY | Facility: CLINIC | Age: 39
End: 2024-03-29
Payer: COMMERCIAL

## 2024-03-29 PROCEDURE — 99213 OFFICE O/P EST LOW 20 MIN: CPT

## 2024-03-29 NOTE — ASSESSMENT
[Carbohydrate Consistent Diet] : carbohydrate consistent diet [Hypoglycemia Management] : hypoglycemia management [Self Monitoring of Blood Glucose] : self monitoring of blood glucose [Weight Loss] : weight loss [FreeTextEntry1] : Diabetes - needs help setting the pump. The new rates recommended: Basal: 12 am - 6 am - 0.75 6 am - 12 am - 1.u I/C - 1/8 CF - 1:25 IOB - 3 h Instructions on time of boluses provided;  Use of Glucagon emergency kit - instructions explained in detail. IOB - principle explained; Humalog vs Lyumjev -kinetics explained. F/U in 10-14 days weeks TEB for possible further adjustment of pump rates.

## 2024-03-29 NOTE — HISTORY OF PRESENT ILLNESS
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 Is on Omnipod and G6, however does not use an automatic mode - states her glucose does not react fast enough to corrections.  Last optho - over 2 years ago. Podiatrist - due  CGM report downloaded - TIR - 35%; very high - 39% Pump rates  Basal - 1.35 X 24 h I/C -1/6 CF -1:25  POC A1C - 8.3% POC BG - 146 mg/dl  Lives with father and 10 yo daughter. Is not eating 3 meals a day, but is snacking a lot.  10/10/23 MK F/u on DM and hypothyroidism. Got labs done "a while ago", but we did not get the report. Uses Omnipod %, but with Dexcom G7  - stated the auto mode did not work for her. Curtrent basal rated 1.45/h IOB 3 h. Dexcom report downloaded, and patterns analyzed - needs adjustment of basal rate. BG ~ - 198 mg/dl TIR - 40% Hypothyroidism - we need the lab report. No new complains.  1/24/24 Mk TEB f/u.  This visit was provided via telehealth using real-time 2-way audio visual technology. The patient was located at home at the time of the visit. The provider, ROS NARVAEZ, was located at the medical office located in [place], [place] at the time of the visit. The patient and Provider participated in the telehealth encounter. Verbal consent given by the patient. Feels  fine mostly, however c/o being anxious about insulin - uses omnipod and dexcom Dexcom showed a lot of false readings; pt corrected accordingly, became hypoglycemic, dehydrated; went to ER; No DKA; was d/c the same day. is afraid of hypoglycemia. Concerned about her kidney function - states at the hospital GFR was low.  2/13/24 MK  This visit was provided via telehealth using real-time 2-way audio visual technology. The patient was located at home at the time of the visit. The provider, ROS NARVAEZ, was located at the medical office located in [place], [place] at the time of the visit. The patient and Provider participated in the telehealth encounter. Verbal consent given by the patient. f/u on DM. Is off pump - takes lantus 12 units am and Rapid acting based on i/c-1/6 and cf 1:25. However, BG is high - based on CGM review. States there is a big discrepancy between CGM and FS - about 40 mg/ml. Called  and got new sensors. states modified diet - lost 12 lbs. Saw GI - has endoscopy scheduled. has questions about Tx of hypoglycemia; Would like to re-start pump - needs guidance. Sees therapist for anxiety. Lab results discussed in detail - needs adjustment of Synthroid (might help with anxiety as well).  3/29/24 MK F/U on DM. Feels better, states G-6 is much more accurate than g-7 on her, so she'll use Omnipod 5 with g-6, but will not do the automated mode. Re-started G6 today - no reports are available for download. Needs help with setting the pimp. States lost ~ 20 lbs by modifying diet. Current weight is 165 lbs Has questions about Tx of hypoglycemia, as well as time for meal bolus. .

## 2024-03-29 NOTE — REASON FOR VISIT
[Follow - Up] : a follow-up visit [DM Type 1] : DM Type 1 [Insulin Pump] : insulin pump management [Weight Management/Obesity] : weight management/obesity

## 2024-04-02 ENCOUNTER — APPOINTMENT (OUTPATIENT)
Dept: OBGYN | Facility: CLINIC | Age: 39
End: 2024-04-02

## 2024-04-16 ENCOUNTER — APPOINTMENT (OUTPATIENT)
Dept: ENDOCRINOLOGY | Facility: CLINIC | Age: 39
End: 2024-04-16

## 2024-04-22 ENCOUNTER — APPOINTMENT (OUTPATIENT)
Dept: ENDOCRINOLOGY | Facility: CLINIC | Age: 39
End: 2024-04-22

## 2024-05-17 ENCOUNTER — NON-APPOINTMENT (OUTPATIENT)
Age: 39
End: 2024-05-17

## 2024-05-17 RX ORDER — URINE ACETONE TEST STRIPS
STRIP MISCELLANEOUS
Qty: 2 | Refills: 0 | Status: ACTIVE | COMMUNITY
Start: 2024-05-17 | End: 1900-01-01

## 2024-06-07 ENCOUNTER — APPOINTMENT (OUTPATIENT)
Dept: ENDOCRINOLOGY | Facility: CLINIC | Age: 39
End: 2024-06-07
Payer: COMMERCIAL

## 2024-06-07 DIAGNOSIS — E03.9 HYPOTHYROIDISM, UNSPECIFIED: ICD-10-CM

## 2024-06-07 DIAGNOSIS — E78.00 PURE HYPERCHOLESTEROLEMIA, UNSPECIFIED: ICD-10-CM

## 2024-06-07 DIAGNOSIS — E55.9 VITAMIN D DEFICIENCY, UNSPECIFIED: ICD-10-CM

## 2024-06-07 PROCEDURE — 99213 OFFICE O/P EST LOW 20 MIN: CPT

## 2024-06-07 NOTE — ASSESSMENT
[Long Term Vascular Complications] : long term vascular complications of diabetes [Hypoglycemia Management] : hypoglycemia management [Ketone Testing] : ketone testing [FreeTextEntry1] : Diabetes - uncontrolled. long conversation about importance of glycemic control and adherence to insulin. I asked pt to come in person next week, 2-3 h after lunch to check in the office her response to insulin. Will repeat labs prior to that visit - order placed.

## 2024-06-07 NOTE — HISTORY OF PRESENT ILLNESS
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 Follow up after a year. Is on Omnipod and G6, however does not use an automatic mode - states her glucose does not react fast enough to corrections.  Last optho - over 2 years ago. Podiatrist - due  CGM report downloaded - TIR - 35%; very high - 39% Pump rates  Basal - 1.35 X 24 h I/C -1/6 CF -1:25  POC A1C - 8.3% POC BG - 146 mg/dl  Lives with father and 10 yo daughter. Is not eating 3 meals a day, but is snacking a lot.  10/10/23 MK F/u on DM and hypothyroidism. Got labs done "a while ago", but we did not get the report. Uses Omnipod %, but with Dexcom G7  - stated the auto mode did not work for her. Curtrent basal rated 1.45/h IOB 3 h. Dexcom report downloaded, and patterns analyzed - needs adjustment of basal rate. BG ~ - 198 mg/dl TIR - 40% Hypothyroidism - we need the lab report. No new complains.  1/24/24 Mk TEB f/u.  This visit was provided via telehealth using real-time 2-way audio visual technology. The patient was located at home at the time of the visit. The provider, ROS NARVAEZ, was located at the medical office located in [place], [place] at the time of the visit. The patient and Provider participated in the telehealth encounter. Verbal consent given by the patient. Feels  fine mostly, however c/o being anxious about insulin - uses omnipod and dexcom Dexcom showed a lot of false readings; pt corrected accordingly, became hypoglycemic, dehydrated; went to ER; No DKA; was d/c the same day. is afraid of hypoglycemia. Concerned about her kidney function - states at the hospital GFR was low.  2/13/24 MK  This visit was provided via telehealth using real-time 2-way audio visual technology. The patient was located at home at the time of the visit. The provider, ROS NARVAEZ, was located at the medical office located in [place], [place] at the time of the visit. The patient and Provider participated in the telehealth encounter. Verbal consent given by the patient. f/u on DM. Is off pump - takes lantus 12 units am and Rapid acting based on i/c-1/6 and cf 1:25. However, BG is high - based on CGM review. States there is a big discrepancy between CGM and FS - about 40 mg/ml. Called  and got new sensors. states modified diet - lost 12 lbs. Saw GI - has endoscopy scheduled. has questions about Tx of hypoglycemia; Would like to re-start pump - needs guidance. Sees therapist for anxiety. Lab results discussed in detail - needs adjustment of Synthroid (might help with anxiety as well).  3/29/24 MK F/U on DM. Feels better, states G-6 is much more accurate than g-7 on her, so she'll use Omnipod 5 with g-6, but will not do the automated mode. Re-started G6 today - no reports are available for download. Needs help with setting the pimp. States lost ~ 20 lbs by modifying diet. Current weight is 165 lbs Has questions about Tx of hypoglycemia, as well as time for meal bolus. . 6/7/2024 MK  This visit was provided via telehealth using real-time 2-way audio visual technology. The patient was located at home at the time of the visit. The provider, ROS NARVAEZ, was located at the medical office located in [place], [place] at the time of the visit. The patient and Provider participated in the telehealth encounter. Verbal consent given by the patient. f/u on DM. Since last visit - on May 17th went to ER, had moderate ketones, but was sent back home. Is still on MDI - lantus 15 Units; Prandial ~ 4 Units with meals. Modified diet a lot - very low carbs. Lost 30-35 lbs. CGM report downloaded - TIR - 0% Everything is either high or very high with BG average of 294 mg/dl. Pt states she still has fear of insulin, after severe hypoglycemia episode in January. Reluctant to re-start te pump. labs from the hospital reviewed.

## 2024-06-13 ENCOUNTER — APPOINTMENT (OUTPATIENT)
Dept: ENDOCRINOLOGY | Facility: CLINIC | Age: 39
End: 2024-06-13
Payer: COMMERCIAL

## 2024-06-13 VITALS
BODY MASS INDEX: 28.72 KG/M2 | SYSTOLIC BLOOD PRESSURE: 124 MMHG | DIASTOLIC BLOOD PRESSURE: 90 MMHG | WEIGHT: 152 LBS | HEART RATE: 100 BPM

## 2024-06-13 DIAGNOSIS — E10.9 TYPE 1 DIABETES MELLITUS W/OUT COMPLICATIONS: ICD-10-CM

## 2024-06-13 LAB
25(OH)D3 SERPL-MCNC: 15.2 NG/ML
ALBUMIN SERPL ELPH-MCNC: 4.4 G/DL
ALP BLD-CCNC: 62 U/L
ALT SERPL-CCNC: 13 U/L
ANION GAP SERPL CALC-SCNC: 19 MMOL/L
AST SERPL-CCNC: 18 U/L
BILIRUB SERPL-MCNC: 0.4 MG/DL
BUN SERPL-MCNC: 11 MG/DL
CALCIUM SERPL-MCNC: 9.2 MG/DL
CHLORIDE SERPL-SCNC: 99 MMOL/L
CHOLEST SERPL-MCNC: 357 MG/DL
CO2 SERPL-SCNC: 20 MMOL/L
CREAT SERPL-MCNC: 0.79 MG/DL
EGFR: 97 ML/MIN/1.73M2
ESTIMATED AVERAGE GLUCOSE: 283 MG/DL
GLUCOSE BLDC GLUCOMTR-MCNC: 227
GLUCOSE SERPL-MCNC: 235 MG/DL
HBA1C MFR BLD HPLC: 11.5 %
HDLC SERPL-MCNC: 58 MG/DL
LDLC SERPL CALC-MCNC: 278 MG/DL
NONHDLC SERPL-MCNC: 298 MG/DL
POTASSIUM SERPL-SCNC: 4.9 MMOL/L
PROT SERPL-MCNC: 7.2 G/DL
SODIUM SERPL-SCNC: 138 MMOL/L
T3 SERPL-MCNC: 70 NG/DL
T4 FREE SERPL-MCNC: 0.7 NG/DL
TRIGL SERPL-MCNC: 116 MG/DL
TSH SERPL-ACNC: 25.3 UIU/ML

## 2024-06-13 PROCEDURE — 99215 OFFICE O/P EST HI 40 MIN: CPT

## 2024-06-13 PROCEDURE — 95251 CONT GLUC MNTR ANALYSIS I&R: CPT

## 2024-06-13 PROCEDURE — 82962 GLUCOSE BLOOD TEST: CPT

## 2024-06-13 PROCEDURE — G2212 PROLONG OUTPT/OFFICE VIS: CPT

## 2024-06-13 RX ORDER — ATORVASTATIN CALCIUM 10 MG/1
10 TABLET, FILM COATED ORAL DAILY
Qty: 30 | Refills: 5 | Status: ACTIVE | COMMUNITY
Start: 2021-06-24 | End: 1900-01-01

## 2024-06-13 RX ORDER — ADHESIVE TAPE 3"X 2.3 YD
50 MCG TAPE, NON-MEDICATED TOPICAL DAILY
Qty: 90 | Refills: 3 | Status: ACTIVE | COMMUNITY
Start: 2024-06-13 | End: 1900-01-01

## 2024-06-13 RX ORDER — LEVOTHYROXINE SODIUM 0.1 MG/1
100 TABLET ORAL DAILY
Qty: 30 | Refills: 3 | Status: ACTIVE | COMMUNITY
Start: 2024-06-13 | End: 1900-01-01

## 2024-06-13 NOTE — ASSESSMENT
[Carbohydrate Consistent Diet] : carbohydrate consistent diet [Ketone Testing] : ketone testing [Self Monitoring of Blood Glucose] : self monitoring of blood glucose [Insulin Self-Administration] : insulin self-administration [Injection Technique, Storage, Sharps Disposal] : injection technique, storage, and sharps disposal [FreeTextEntry1] : Diabetes - uncontrolled. Will re-start pump; explained  - the new rates are much lower than they will be, but since her fear of hypoglycemia, we will make frequent adjustments till reach the goal. basal - 0.6 for 24 h I/C - 1/15 CF - 1:30 IOB 4 H BG target 120-130. I'll look at the CGM remotely next week, and possibly make some adjustments. Will try Lyumjev.  Hypothyroidism - will re-start t4 - 100 mcg/day - refills send. High lipids - re-start atorvastatin low vit D - start 2000 IU daily  Low carb diet and DKA - referred to RD.  Will re-check labs in 6-8 weeks. TEB f/u in 2 weeks

## 2024-06-13 NOTE — PHYSICAL EXAM
[Alert] : alert [Well Nourished] : well nourished [Healthy Appearance] : healthy appearance [No Acute Distress] : no acute distress [No Respiratory Distress] : no respiratory distress [No Accessory Muscle Use] : no accessory muscle use [Normal Rate and Effort] : normal respiratory rate and effort [Normal Rate] : heart rate was normal [No Edema] : no peripheral edema [Spine Straight] : spine straight [No Stigmata of Cushings Syndrome] : no stigmata of Cushings Syndrome [Normal Gait] : normal gait [No Clubbing, Cyanosis] : no clubbing  or cyanosis of the fingernails [No Involuntary Movements] : no involuntary movements were seen [No Joint Swelling] : no joint swelling seen [No Rash] : no rash [No Tremors] : no tremors [Oriented x3] : oriented to person, place, and time

## 2024-06-13 NOTE — HISTORY OF PRESENT ILLNESS
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 Is on Omnipod and G6, however does not use an automatic mode - states her glucose does not react fast enough to corrections.  Last optho - over 2 years ago. Podiatrist - due  CGM report downloaded - TIR - 35%; very high - 39% Pump rates  Basal - 1.35 X 24 h I/C -1/6 CF -1:25  POC A1C - 8.3% POC BG - 146 mg/dl  Lives with father and 10 yo daughter. Is not eating 3 meals a day, but is snacking a lot.  10/10/23 MK F/u on DM and hypothyroidism. Got labs done "a while ago", but we did not get the report. Uses Omnipod %, but with Dexcom G7  - stated the auto mode did not work for her. Curtrent basal rated 1.45/h IOB 3 h. Dexcom report downloaded, and patterns analyzed - needs adjustment of basal rate. BG ~ - 198 mg/dl TIR - 40% Hypothyroidism - we need the lab report. No new complains.  1/24/24 Mk TEB f/u.  This visit was provided via telehealth using real-time 2-way audio visual technology. The patient was located at home at the time of the visit. The provider, ROS NARVAEZ, was located at the medical office located in [place], [place] at the time of the visit. The patient and Provider participated in the telehealth encounter. Verbal consent given by the patient. Feels  fine mostly, however c/o being anxious about insulin - uses omnipod and dexcom Dexcom showed a lot of false readings; pt corrected accordingly, became hypoglycemic, dehydrated; went to ER; No DKA; was d/c the same day. is afraid of hypoglycemia. Concerned about her kidney function - states at the hospital GFR was low.  2/13/24 MK  This visit was provided via telehealth using real-time 2-way audio visual technology. The patient was located at home at the time of the visit. The provider, ROS NARVAEZ, was located at the medical office located in [place], [place] at the time of the visit. The patient and Provider participated in the telehealth encounter. Verbal consent given by the patient. f/u on DM. Is off pump - takes lantus 12 units am and Rapid acting based on i/c-1/6 and cf 1:25. However, BG is high - based on CGM review. States there is a big discrepancy between CGM and FS - about 40 mg/ml. Called  and got new sensors. states modified diet - lost 12 lbs. Saw GI - has endoscopy scheduled. has questions about Tx of hypoglycemia; Would like to re-start pump - needs guidance. Sees therapist for anxiety. Lab results discussed in detail - needs adjustment of Synthroid (might help with anxiety as well).  3/29/24 MK F/U on DM. Feels better, states G-6 is much more accurate than g-7 on her, so she'll use Omnipod 5 with g-6, but will not do the automated mode. Re-started G6 today - no reports are available for download. Needs help with setting the pimp. States lost ~ 20 lbs by modifying diet. Current weight is 165 lbs Has questions about Tx of hypoglycemia, as well as time for meal bolus. . 6/7/2024 MK  This visit was provided via telehealth using real-time 2-way audio visual technology. The patient was located at home at the time of the visit. The provider, ROS NARVAEZ, was located at the medical office located in [place], [place] at the time of the visit. The patient and Provider participated in the telehealth encounter. Verbal consent given by the patient. f/u on DM. Since last visit - on May 17th went to ER, had moderate ketones, but was sent back home. Is still on MDI - lantus 15 Units; Prandial ~ 4 Units with meals. Modified diet a lot - very low carbs. Lost 30-35 lbs. CGM report downloaded - TIR - 0% Everything is either high or very high with BG average of 294 mg/dl. Pt states she still has fear of insulin, after severe hypoglycemia episode in January. Reluctant to re-start te pump. labs from the hospital reviewed.  6/13/24 MK F/U on DM. Glycemic control got worse - CGM downloaded, and report discuses with pt - TIR - 3%; the rest is high; BGF average - 286 mg/dl; Ate salad at 12:30 - BG was 224 mg/dl Took 2 Units of insulin at 1 PM BG in the office 240 mg/dl at 2:40 pm and 233 at 3 PM. Administered 1 Unit of Humalog - re-check BG at 4 pm - 211mg/dl   Pt is on a very low carb diet.  Needs to re-set pump rates - got new Omnipod PDM..  Recent labs reviewed. TSH - 25.30 - stopped taking synthroid in February. Vit D - 15. LDL - 278 mg/dl.

## 2024-06-14 ENCOUNTER — TRANSCRIPTION ENCOUNTER (OUTPATIENT)
Age: 39
End: 2024-06-14

## 2024-06-19 ENCOUNTER — APPOINTMENT (OUTPATIENT)
Dept: ENDOCRINOLOGY | Facility: CLINIC | Age: 39
End: 2024-06-19
Payer: COMMERCIAL

## 2024-06-19 PROCEDURE — G0108 DIAB MANAGE TRN  PER INDIV: CPT | Mod: 95

## 2024-06-27 ENCOUNTER — APPOINTMENT (OUTPATIENT)
Dept: ENDOCRINOLOGY | Facility: CLINIC | Age: 39
End: 2024-06-27
Payer: COMMERCIAL

## 2024-06-27 PROCEDURE — 99213 OFFICE O/P EST LOW 20 MIN: CPT

## 2024-07-11 ENCOUNTER — APPOINTMENT (OUTPATIENT)
Dept: ENDOCRINOLOGY | Facility: CLINIC | Age: 39
End: 2024-07-11

## 2024-07-11 PROCEDURE — G0108 DIAB MANAGE TRN  PER INDIV: CPT | Mod: 95

## 2024-07-12 ENCOUNTER — APPOINTMENT (OUTPATIENT)
Dept: ENDOCRINOLOGY | Facility: CLINIC | Age: 39
End: 2024-07-12
Payer: COMMERCIAL

## 2024-07-12 PROCEDURE — 99213 OFFICE O/P EST LOW 20 MIN: CPT

## 2024-07-19 ENCOUNTER — APPOINTMENT (OUTPATIENT)
Dept: ENDOCRINOLOGY | Facility: CLINIC | Age: 39
End: 2024-07-19

## 2024-08-27 ENCOUNTER — TRANSCRIPTION ENCOUNTER (OUTPATIENT)
Age: 39
End: 2024-08-27

## 2024-08-28 ENCOUNTER — TRANSCRIPTION ENCOUNTER (OUTPATIENT)
Age: 39
End: 2024-08-28

## 2024-08-29 ENCOUNTER — TRANSCRIPTION ENCOUNTER (OUTPATIENT)
Age: 39
End: 2024-08-29

## 2024-08-29 DIAGNOSIS — E78.00 PURE HYPERCHOLESTEROLEMIA, UNSPECIFIED: ICD-10-CM

## 2024-08-29 DIAGNOSIS — E10.9 TYPE 1 DIABETES MELLITUS W/OUT COMPLICATIONS: ICD-10-CM

## 2024-08-29 DIAGNOSIS — E03.9 HYPOTHYROIDISM, UNSPECIFIED: ICD-10-CM

## 2024-08-29 DIAGNOSIS — E55.9 VITAMIN D DEFICIENCY, UNSPECIFIED: ICD-10-CM

## 2024-09-06 ENCOUNTER — APPOINTMENT (OUTPATIENT)
Dept: ENDOCRINOLOGY | Facility: CLINIC | Age: 39
End: 2024-09-06
Payer: COMMERCIAL

## 2024-09-06 PROCEDURE — 99213 OFFICE O/P EST LOW 20 MIN: CPT

## 2024-09-06 NOTE — ASSESSMENT
[Long Term Vascular Complications] : long term vascular complications of diabetes [Self Monitoring of Blood Glucose] : self monitoring of blood glucose [Insulin Self-Administration] : insulin self-administration [FreeTextEntry1] : DM - uncontrolled. Long conversation about the benefits if control and high risk of complications; Ketosis and weight loss. Risk of kidney damage. Pt agreed to make 1 change a week - will split Lantus and take 10 Units bid; after that will start adding 1 unit of humalog per meal. Glycemic goals discussed. F/U TEB in 1 mo.

## 2024-09-06 NOTE — HISTORY OF PRESENT ILLNESS
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 Is on Omnipod and G6, however does not use an automatic mode - states her glucose does not react fast enough to corrections.  Last optho - over 2 years ago. Podiatrist - due  CGM report downloaded - TIR - 35%; very high - 39% Pump rates  Basal - 1.35 X 24 h I/C -1/6 CF -1:25  POC A1C - 8.3% POC BG - 146 mg/dl  Lives with father and 10 yo daughter. Is not eating 3 meals a day, but is snacking a lot.  10/10/23 MK F/u on DM and hypothyroidism. Got labs done "a while ago", but we did not get the report. Uses Omnipod %, but with Dexcom G7  - stated the auto mode did not work for her. Curtrent basal rated 1.45/h IOB 3 h. Dexcom report downloaded, and patterns analyzed - needs adjustment of basal rate. BG ~ - 198 mg/dl TIR - 40% Hypothyroidism - we need the lab report. No new complains.  1/24/24 Mk TEB f/u.  This visit was provided via telehealth using real-time 2-way audio visual technology. The patient was located at home at the time of the visit. The provider, ROS NARVAEZ, was located at the medical office located in [place], [place] at the time of the visit. The patient and Provider participated in the telehealth encounter. Verbal consent given by the patient. Feels  fine mostly, however c/o being anxious about insulin - uses omnipod and dexcom Dexcom showed a lot of false readings; pt corrected accordingly, became hypoglycemic, dehydrated; went to ER; No DKA; was d/c the same day. is afraid of hypoglycemia. Concerned about her kidney function - states at the hospital GFR was low.  2/13/24 MK  This visit was provided via telehealth using real-time 2-way audio visual technology. The patient was located at home at the time of the visit. The provider, ROS NARVAEZ, was located at the medical office located in [place], [place] at the time of the visit. The patient and Provider participated in the telehealth encounter. Verbal consent given by the patient. f/u on DM. Is off pump - takes lantus 12 units am and Rapid acting based on i/c-1/6 and cf 1:25. However, BG is high - based on CGM review. States there is a big discrepancy between CGM and FS - about 40 mg/ml. Called  and got new sensors. states modified diet - lost 12 lbs. Saw GI - has endoscopy scheduled. has questions about Tx of hypoglycemia; Would like to re-start pump - needs guidance. Sees therapist for anxiety. Lab results discussed in detail - needs adjustment of Synthroid (might help with anxiety as well).  3/29/24 MK F/U on DM. Feels better, states G-6 is much more accurate than g-7 on her, so she'll use Omnipod 5 with g-6, but will not do the automated mode. Re-started G6 today - no reports are available for download. Needs help with setting the pimp. States lost ~ 20 lbs by modifying diet. Current weight is 165 lbs Has questions about Tx of hypoglycemia, as well as time for meal bolus. . 6/7/2024 MK  This visit was provided via telehealth using real-time 2-way audio visual technology. The patient was located at home at the time of the visit. The provider, ROS NARVAEZ, was located at the medical office located in [place], [place] at the time of the visit. The patient and Provider participated in the telehealth encounter. Verbal consent given by the patient. f/u on DM. Since last visit - on May 17th went to ER, had moderate ketones, but was sent back home. Is still on MDI - lantus 15 Units; Prandial ~ 4 Units with meals. Modified diet a lot - very low carbs. Lost 30-35 lbs. CGM report downloaded - TIR - 0% Everything is either high or very high with BG average of 294 mg/dl. Pt states she still has fear of insulin, after severe hypoglycemia episode in January. Reluctant to re-start te pump. labs from the hospital reviewed.  6/13/24 MK F/U on DM. Glycemic control got worse - CGM downloaded, and report discuses with pt - TIR - 3%; the rest is high; BGF average - 286 mg/dl; Ate salad at 12:30 - BG was 224 mg/dl Took 2 Units of insulin at 1 PM BG in the office 240 mg/dl at 2:40 pm and 233 at 3 PM. Administered 1 Unit of Humalog - re-check BG at 4 pm - 211mg/dl   Pt is on a very low carb diet.  Needs to re-set pump rates - got new Omnipod PDM..  Recent labs reviewed. TSH - 25.30 - stopped taking synthroid in February. Vit D - 15. LDL - 278 mg/dl.  6/27/24 MK  This visit was provided via telehealth using real-time 2-way audio visual technology. The patient was located at home at the time of the visit. The provider, ROS NARVAEZ, was located at the medical office located in [place], [place] at the time of the visit. The patient and Provider participated in the telehealth encounter. Verbal consent given by the patient.  F/U on DM and hypothyroidism. Feels better. Re-started Synthroid; Saw Corrie - has better understanding of food components and BG. Still did not re-start the pump. Lantus 18 Units and Humalog 1-4 based on carbs. CGM report downloaded - there is significant improvement, however BG is not at goal. Details discussed with pt at lengths, Did not start vit D yet. Has questions about Repatha.  7/12/24 MK  This visit was provided via telehealth using real-time 2-way audio visual technology. The patient was located at home at the time of the visit. The provider, ROS NARVAEZ, was located at the medical office located in [place], [place] at the time of the visit. The patient and Provider participated in the telehealth encounter. Verbal consent given by the patient.  F/U on DM and insulin adjustment. CGM downloaded and discussed. Minimal improvement in daytime pattern, however evening and early am is high. Did not re-start pump yet. Long conversation about minimal risk of hypoglycemia with her current BG patterns and the amount of insulin she administers. Pt states she understands everything, but can't overcome "irrational fear".  9/6/24 MK  This visit was provided via telehealth using real-time 2-way audio visual technology. The patient was located at home at the time of the visit. The provider, ROS NARVAEZ, was located at the medical office located in [place], [place] at the time of the visit. The patient and Provider participated in the telehealth encounter. Verbal consent given by the patient. F/U on DM. States feels fine. CGM report downloaded and discussed. TIR - 0% BG~ 306 mg/dl. States current weight 150 lbs (lost 45-50 lb). Modified diet. Lantus - 18 Units am Humalog 4-5 Units. Still has fear of lows.

## 2024-09-13 ENCOUNTER — TRANSCRIPTION ENCOUNTER (OUTPATIENT)
Age: 39
End: 2024-09-13

## 2024-09-15 ENCOUNTER — NON-APPOINTMENT (OUTPATIENT)
Age: 39
End: 2024-09-15

## 2024-09-20 ENCOUNTER — APPOINTMENT (OUTPATIENT)
Dept: PULMONOLOGY | Facility: CLINIC | Age: 39
End: 2024-09-20
Payer: COMMERCIAL

## 2024-09-20 VITALS
HEIGHT: 61 IN | DIASTOLIC BLOOD PRESSURE: 70 MMHG | HEART RATE: 71 BPM | SYSTOLIC BLOOD PRESSURE: 116 MMHG | OXYGEN SATURATION: 98 % | BODY MASS INDEX: 25.68 KG/M2 | WEIGHT: 136 LBS | RESPIRATION RATE: 16 BRPM

## 2024-09-20 DIAGNOSIS — R91.8 OTHER NONSPECIFIC ABNORMAL FINDING OF LUNG FIELD: ICD-10-CM

## 2024-09-20 PROCEDURE — 99203 OFFICE O/P NEW LOW 30 MIN: CPT

## 2024-09-20 NOTE — HISTORY OF PRESENT ILLNESS
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

## 2024-09-20 NOTE — DISCUSSION/SUMMARY
[FreeTextEntry1] : IMP  Small nodules and granulomata - likely benign  Plan  Quantiferon TB assay CT FU in 12-18 months Clinical FU in one year.

## 2024-09-20 NOTE — CONSULT LETTER
[Dear  ___] : Dear  [unfilled], [Consult Letter:] : I had the pleasure of evaluating your patient, [unfilled]. [Please see my note below.] : Please see my note below. [Consult Closing:] : Thank you very much for allowing me to participate in the care of this patient.  If you have any questions, please do not hesitate to contact me. [Sincerely,] : Sincerely, [DrMarco Antonio  ___] : Dr. PETERSON

## 2024-09-30 ENCOUNTER — TRANSCRIPTION ENCOUNTER (OUTPATIENT)
Age: 39
End: 2024-09-30

## 2024-09-30 ENCOUNTER — APPOINTMENT (OUTPATIENT)
Dept: ENDOCRINOLOGY | Facility: CLINIC | Age: 39
End: 2024-09-30
Payer: COMMERCIAL

## 2024-09-30 DIAGNOSIS — E10.9 TYPE 1 DIABETES MELLITUS W/OUT COMPLICATIONS: ICD-10-CM

## 2024-09-30 PROCEDURE — G0108 DIAB MANAGE TRN  PER INDIV: CPT | Mod: 95

## 2024-10-01 ENCOUNTER — TRANSCRIPTION ENCOUNTER (OUTPATIENT)
Age: 39
End: 2024-10-01

## 2024-10-02 ENCOUNTER — TRANSCRIPTION ENCOUNTER (OUTPATIENT)
Age: 39
End: 2024-10-02

## 2024-10-02 RX ORDER — URINE ACETONE TEST STRIPS
STRIP MISCELLANEOUS
Qty: 100 | Refills: 3 | Status: ACTIVE | COMMUNITY
Start: 2024-10-02 | End: 1900-01-01

## 2024-10-03 ENCOUNTER — TRANSCRIPTION ENCOUNTER (OUTPATIENT)
Age: 39
End: 2024-10-03

## 2024-10-04 ENCOUNTER — TRANSCRIPTION ENCOUNTER (OUTPATIENT)
Age: 39
End: 2024-10-04

## 2024-10-04 LAB
ESTIMATED AVERAGE GLUCOSE: 255 MG/DL
HBA1C MFR BLD HPLC: 10.5 %
INSULIN SERPL-MCNC: 1.4 UU/ML

## 2024-10-08 LAB — C PEPTIDE SERPL-MCNC: <0.1 NG/ML

## 2024-10-09 ENCOUNTER — APPOINTMENT (OUTPATIENT)
Dept: ENDOCRINOLOGY | Facility: CLINIC | Age: 39
End: 2024-10-09
Payer: COMMERCIAL

## 2024-10-09 PROCEDURE — 99213 OFFICE O/P EST LOW 20 MIN: CPT

## 2024-11-06 ENCOUNTER — APPOINTMENT (OUTPATIENT)
Dept: OBGYN | Facility: CLINIC | Age: 39
End: 2024-11-06

## 2024-11-26 ENCOUNTER — APPOINTMENT (OUTPATIENT)
Dept: INTERNAL MEDICINE | Facility: CLINIC | Age: 39
End: 2024-11-26

## 2024-12-05 ENCOUNTER — RX RENEWAL (OUTPATIENT)
Age: 39
End: 2024-12-05

## 2025-01-16 ENCOUNTER — TRANSCRIPTION ENCOUNTER (OUTPATIENT)
Age: 40
End: 2025-01-16

## 2025-01-16 DIAGNOSIS — E78.00 PURE HYPERCHOLESTEROLEMIA, UNSPECIFIED: ICD-10-CM

## 2025-01-16 DIAGNOSIS — E03.9 HYPOTHYROIDISM, UNSPECIFIED: ICD-10-CM

## 2025-01-16 DIAGNOSIS — E10.9 TYPE 1 DIABETES MELLITUS W/OUT COMPLICATIONS: ICD-10-CM

## 2025-01-16 DIAGNOSIS — E55.9 VITAMIN D DEFICIENCY, UNSPECIFIED: ICD-10-CM

## 2025-01-17 ENCOUNTER — APPOINTMENT (OUTPATIENT)
Dept: ENDOCRINOLOGY | Facility: CLINIC | Age: 40
End: 2025-01-17

## 2025-01-17 LAB
25(OH)D3 SERPL-MCNC: 14.1 NG/ML
ALBUMIN SERPL ELPH-MCNC: 4.3 G/DL
ALP BLD-CCNC: 54 U/L
ALT SERPL-CCNC: 19 U/L
ANION GAP SERPL CALC-SCNC: 12 MMOL/L
AST SERPL-CCNC: 14 U/L
BILIRUB SERPL-MCNC: 0.4 MG/DL
BUN SERPL-MCNC: 17 MG/DL
CALCIUM SERPL-MCNC: 9.5 MG/DL
CHLORIDE SERPL-SCNC: 100 MMOL/L
CHOLEST SERPL-MCNC: 324 MG/DL
CO2 SERPL-SCNC: 24 MMOL/L
CREAT SERPL-MCNC: 0.74 MG/DL
CREAT SPEC-SCNC: 123 MG/DL
EGFR: 104 ML/MIN/1.73M2
ESTIMATED AVERAGE GLUCOSE: 295 MG/DL
GLUCOSE SERPL-MCNC: 272 MG/DL
HBA1C MFR BLD HPLC: 11.9 %
HDLC SERPL-MCNC: 85 MG/DL
LDLC SERPL CALC-MCNC: 228 MG/DL
MICROALBUMIN 24H UR DL<=1MG/L-MCNC: <1.2 MG/DL
MICROALBUMIN/CREAT 24H UR-RTO: NORMAL MG/G
NONHDLC SERPL-MCNC: 238 MG/DL
POTASSIUM SERPL-SCNC: 4.7 MMOL/L
PROT SERPL-MCNC: 6.9 G/DL
SODIUM SERPL-SCNC: 137 MMOL/L
T3 SERPL-MCNC: 44 NG/DL
T4 FREE SERPL-MCNC: 1 NG/DL
THYROGLOB AB SERPL-ACNC: 19.4 IU/ML
THYROPEROXIDASE AB SERPL IA-ACNC: 26.5 IU/ML
TRIGL SERPL-MCNC: 72 MG/DL
TSH SERPL-ACNC: 9.31 UIU/ML

## 2025-01-17 PROCEDURE — 99202 OFFICE O/P NEW SF 15 MIN: CPT | Mod: 95

## 2025-01-17 PROCEDURE — 99212 OFFICE O/P EST SF 10 MIN: CPT | Mod: 95

## 2025-01-17 PROCEDURE — G2211 COMPLEX E/M VISIT ADD ON: CPT | Mod: NC

## 2025-01-17 RX ORDER — ATORVASTATIN CALCIUM 10 MG/1
10 TABLET, FILM COATED ORAL
Qty: 1 | Refills: 3 | Status: ACTIVE | COMMUNITY
Start: 2025-01-17 | End: 1900-01-01

## 2025-03-07 ENCOUNTER — RX RENEWAL (OUTPATIENT)
Age: 40
End: 2025-03-07

## 2025-03-10 ENCOUNTER — NON-APPOINTMENT (OUTPATIENT)
Age: 40
End: 2025-03-10

## 2025-03-10 ENCOUNTER — RX RENEWAL (OUTPATIENT)
Age: 40
End: 2025-03-10

## 2025-04-16 ENCOUNTER — TRANSCRIPTION ENCOUNTER (OUTPATIENT)
Age: 40
End: 2025-04-16

## 2025-05-15 ENCOUNTER — RX RENEWAL (OUTPATIENT)
Age: 40
End: 2025-05-15

## 2025-06-23 ENCOUNTER — NON-APPOINTMENT (OUTPATIENT)
Age: 40
End: 2025-06-23

## 2025-06-23 ENCOUNTER — TRANSCRIPTION ENCOUNTER (OUTPATIENT)
Age: 40
End: 2025-06-23

## 2025-07-08 ENCOUNTER — RX RENEWAL (OUTPATIENT)
Age: 40
End: 2025-07-08

## 2025-08-05 DIAGNOSIS — E03.9 HYPOTHYROIDISM, UNSPECIFIED: ICD-10-CM

## 2025-08-05 DIAGNOSIS — E55.9 VITAMIN D DEFICIENCY, UNSPECIFIED: ICD-10-CM

## 2025-08-05 DIAGNOSIS — E78.00 PURE HYPERCHOLESTEROLEMIA, UNSPECIFIED: ICD-10-CM

## 2025-08-05 DIAGNOSIS — E10.9 TYPE 1 DIABETES MELLITUS W/OUT COMPLICATIONS: ICD-10-CM

## 2025-08-06 ENCOUNTER — TRANSCRIPTION ENCOUNTER (OUTPATIENT)
Age: 40
End: 2025-08-06

## 2025-08-07 LAB
25(OH)D3 SERPL-MCNC: 17 NG/ML
ALBUMIN SERPL ELPH-MCNC: 4.1 G/DL
ALBUMIN, RANDOM URINE: <1.2 MG/DL
ALP BLD-CCNC: 53 U/L
ALT SERPL-CCNC: 11 U/L
ANION GAP SERPL CALC-SCNC: 15 MMOL/L
AST SERPL-CCNC: 15 U/L
BILIRUB SERPL-MCNC: 0.6 MG/DL
BUN SERPL-MCNC: 20 MG/DL
CALCIUM SERPL-MCNC: 9.3 MG/DL
CHLORIDE SERPL-SCNC: 103 MMOL/L
CHOLEST SERPL-MCNC: 195 MG/DL
CO2 SERPL-SCNC: 20 MMOL/L
CREAT SERPL-MCNC: 0.67 MG/DL
CREAT SPEC-SCNC: 163 MG/DL
EGFRCR SERPLBLD CKD-EPI 2021: 113 ML/MIN/1.73M2
ESTIMATED AVERAGE GLUCOSE: 298 MG/DL
GLUCOSE SERPL-MCNC: 210 MG/DL
HBA1C MFR BLD HPLC: 12 %
HDLC SERPL-MCNC: 69 MG/DL
LDLC SERPL-MCNC: 112 MG/DL
MICROALBUMIN/CREAT 24H UR-RTO: NORMAL MG/G
NONHDLC SERPL-MCNC: 126 MG/DL
POTASSIUM SERPL-SCNC: 4.1 MMOL/L
PROT SERPL-MCNC: 6.7 G/DL
SODIUM SERPL-SCNC: 138 MMOL/L
T3 SERPL-MCNC: 65 NG/DL
T4 FREE SERPL-MCNC: 1.7 NG/DL
TRIGL SERPL-MCNC: 79 MG/DL
TSH SERPL-ACNC: 0.94 UIU/ML

## 2025-08-19 ENCOUNTER — APPOINTMENT (OUTPATIENT)
Dept: OBGYN | Facility: CLINIC | Age: 40
End: 2025-08-19
Payer: COMMERCIAL

## 2025-08-19 ENCOUNTER — NON-APPOINTMENT (OUTPATIENT)
Age: 40
End: 2025-08-19

## 2025-08-19 VITALS
HEIGHT: 61 IN | WEIGHT: 136 LBS | BODY MASS INDEX: 25.68 KG/M2 | SYSTOLIC BLOOD PRESSURE: 92 MMHG | DIASTOLIC BLOOD PRESSURE: 64 MMHG

## 2025-08-19 DIAGNOSIS — Z01.419 ENCOUNTER FOR GYNECOLOGICAL EXAMINATION (GENERAL) (ROUTINE) W/OUT ABNORMAL FINDINGS: ICD-10-CM

## 2025-08-19 DIAGNOSIS — Z00.00 ENCOUNTER FOR GENERAL ADULT MEDICAL EXAMINATION W/OUT ABNORMAL FINDINGS: ICD-10-CM

## 2025-08-19 DIAGNOSIS — R23.2 FLUSHING: ICD-10-CM

## 2025-08-19 DIAGNOSIS — M54.9 DORSALGIA, UNSPECIFIED: ICD-10-CM

## 2025-08-19 PROCEDURE — 99213 OFFICE O/P EST LOW 20 MIN: CPT | Mod: 25

## 2025-08-19 PROCEDURE — 36415 COLL VENOUS BLD VENIPUNCTURE: CPT

## 2025-08-19 PROCEDURE — 99396 PREV VISIT EST AGE 40-64: CPT

## 2025-08-20 LAB
ESTRADIOL SERPL-MCNC: 313 PG/ML
FSH SERPL-MCNC: 10.5 IU/L
LH SERPL-ACNC: 29.9 IU/L
TSH SERPL-ACNC: 0.58 UIU/ML

## 2025-08-21 LAB — HPV HIGH+LOW RISK DNA PNL CVX: NOT DETECTED

## 2025-08-25 LAB — CYTOLOGY CVX/VAG DOC THIN PREP: NORMAL

## 2025-08-27 ENCOUNTER — APPOINTMENT (OUTPATIENT)
Dept: ENDOCRINOLOGY | Facility: CLINIC | Age: 40
End: 2025-08-27
Payer: COMMERCIAL

## 2025-08-27 PROCEDURE — 99213 OFFICE O/P EST LOW 20 MIN: CPT | Mod: 95
